# Patient Record
Sex: MALE | Race: BLACK OR AFRICAN AMERICAN | Employment: UNEMPLOYED | URBAN - METROPOLITAN AREA
[De-identification: names, ages, dates, MRNs, and addresses within clinical notes are randomized per-mention and may not be internally consistent; named-entity substitution may affect disease eponyms.]

---

## 2020-06-26 ENCOUNTER — APPOINTMENT (OUTPATIENT)
Dept: GENERAL RADIOLOGY | Age: 35
DRG: 137 | End: 2020-06-26
Payer: MEDICAID

## 2020-06-26 ENCOUNTER — HOSPITAL ENCOUNTER (INPATIENT)
Age: 35
LOS: 4 days | Discharge: HOME | DRG: 137 | End: 2020-06-30
Attending: EMERGENCY MEDICINE | Admitting: INTERNAL MEDICINE
Payer: MEDICAID

## 2020-06-26 PROBLEM — U07.1 COVID-19 VIRUS INFECTION: Status: ACTIVE | Noted: 2020-06-26

## 2020-06-26 LAB
ALBUMIN SERPL-MCNC: 3.5 G/DL (ref 3.5–5.2)
ALP BLD-CCNC: 34 U/L (ref 40–129)
ALT SERPL-CCNC: 26 U/L (ref 0–40)
AMPHETAMINE SCREEN, URINE: NOT DETECTED
ANION GAP SERPL CALCULATED.3IONS-SCNC: 12 MMOL/L (ref 7–16)
AST SERPL-CCNC: 94 U/L (ref 0–39)
B.E.: -0.6 MMOL/L (ref -3–3)
BACTERIA: ABNORMAL /HPF
BARBITURATE SCREEN URINE: NOT DETECTED
BASOPHILS ABSOLUTE: 0 E9/L (ref 0–0.2)
BASOPHILS RELATIVE PERCENT: 0.3 % (ref 0–2)
BENZODIAZEPINE SCREEN, URINE: NOT DETECTED
BILIRUB SERPL-MCNC: 1.6 MG/DL (ref 0–1.2)
BILIRUBIN URINE: ABNORMAL
BLOOD, URINE: ABNORMAL
BUN BLDV-MCNC: 15 MG/DL (ref 6–20)
BURR CELLS: ABNORMAL
C-REACTIVE PROTEIN: 3.4 MG/DL (ref 0–0.4)
CALCIUM SERPL-MCNC: 8.2 MG/DL (ref 8.6–10.2)
CANNABINOID SCREEN URINE: NOT DETECTED
CHLORIDE BLD-SCNC: 97 MMOL/L (ref 98–107)
CHLORIDE URINE RANDOM: 22 MMOL/L
CLARITY: ABNORMAL
CO2: 26 MMOL/L (ref 22–29)
COCAINE METABOLITE SCREEN URINE: NOT DETECTED
COHB: 0.7 % (ref 0–1.5)
COLOR: ABNORMAL
CREAT SERPL-MCNC: 1.6 MG/DL (ref 0.7–1.2)
CREATININE URINE: 76 MG/DL (ref 40–278)
CRITICAL: ABNORMAL
DATE ANALYZED: ABNORMAL
DATE OF COLLECTION: ABNORMAL
EOSINOPHILS ABSOLUTE: 0 E9/L (ref 0.05–0.5)
EOSINOPHILS RELATIVE PERCENT: 0.3 % (ref 0–6)
FENTANYL SCREEN, URINE: NOT DETECTED
FERRITIN: NORMAL NG/ML
GFR AFRICAN AMERICAN: 60
GFR NON-AFRICAN AMERICAN: 60 ML/MIN/1.73
GLUCOSE BLD-MCNC: 101 MG/DL (ref 74–99)
GLUCOSE URINE: NEGATIVE MG/DL
HCO3: 20.4 MMOL/L (ref 22–26)
HCT VFR BLD CALC: 36.5 % (ref 37–54)
HEMOGLOBIN: 12 G/DL (ref 12.5–16.5)
HHB: 4.6 % (ref 0–5)
INR BLD: 1.3
KETONES, URINE: 15 MG/DL
LAB: ABNORMAL
LACTATE DEHYDROGENASE: 1739 U/L (ref 135–225)
LACTIC ACID: 1.4 MMOL/L (ref 0.5–2.2)
LEUKOCYTE ESTERASE, URINE: ABNORMAL
LYMPHOCYTES ABSOLUTE: 0.65 E9/L (ref 1.5–4)
LYMPHOCYTES RELATIVE PERCENT: 19.3 % (ref 20–42)
Lab: ABNORMAL
Lab: NORMAL
MCH RBC QN AUTO: 31.3 PG (ref 26–35)
MCHC RBC AUTO-ENTMCNC: 32.9 % (ref 32–34.5)
MCV RBC AUTO: 95.3 FL (ref 80–99.9)
METHADONE SCREEN, URINE: NOT DETECTED
METHB: 1.4 % (ref 0–1.5)
MODE: ABNORMAL
MONOCYTES ABSOLUTE: 0.27 E9/L (ref 0.1–0.95)
MONOCYTES RELATIVE PERCENT: 7.9 % (ref 2–12)
NEUTROPHILS ABSOLUTE: 2.48 E9/L (ref 1.8–7.3)
NEUTROPHILS RELATIVE PERCENT: 72.8 % (ref 43–80)
NITRITE, URINE: NEGATIVE
O2 CONTENT: 16.7 ML/DL
O2 SATURATION: 95.3 % (ref 92–98.5)
O2HB: 93.3 % (ref 94–97)
OPERATOR ID: ABNORMAL
OPIATE SCREEN URINE: NOT DETECTED
OVALOCYTES: ABNORMAL
OXYCODONE URINE: NOT DETECTED
PATIENT TEMP: 37 C
PCO2: 24.3 MMHG (ref 35–45)
PDW BLD-RTO: 11.6 FL (ref 11.5–15)
PH BLOOD GAS: 7.54 (ref 7.35–7.45)
PH UA: 7 (ref 5–9)
PHENCYCLIDINE SCREEN URINE: NOT DETECTED
PLATELET # BLD: 108 E9/L (ref 130–450)
PMV BLD AUTO: 13 FL (ref 7–12)
PO2: 74.3 MMHG (ref 75–100)
POIKILOCYTES: ABNORMAL
POTASSIUM REFLEX MAGNESIUM: 3.8 MMOL/L (ref 3.5–5)
PRO-BNP: <5 PG/ML (ref 0–125)
PROCALCITONIN: 0.1 NG/ML (ref 0–0.08)
PROTEIN UA: >=300 MG/DL
PROTHROMBIN TIME: 15.3 SEC (ref 9.3–12.4)
RBC # BLD: 3.83 E12/L (ref 3.8–5.8)
RBC UA: ABNORMAL /HPF (ref 0–2)
SARS-COV-2, NAAT: DETECTED
SODIUM BLD-SCNC: 135 MMOL/L (ref 132–146)
SODIUM URINE: 36 MMOL/L
SOURCE, BLOOD GAS: ABNORMAL
SPECIFIC GRAVITY UA: 1.01 (ref 1–1.03)
THB: 12.7 G/DL (ref 11.5–16.5)
TIME ANALYZED: 1133
TOTAL CK: 921 U/L (ref 20–200)
TOTAL PROTEIN: 6.7 G/DL (ref 6.4–8.3)
TROPONIN: <0.01 NG/ML (ref 0–0.03)
UROBILINOGEN, URINE: 4 E.U./DL
WBC # BLD: 3.4 E9/L (ref 4.5–11.5)
WBC UA: ABNORMAL /HPF (ref 0–5)

## 2020-06-26 PROCEDURE — 2580000003 HC RX 258: Performed by: SPECIALIST

## 2020-06-26 PROCEDURE — 82550 ASSAY OF CK (CPK): CPT

## 2020-06-26 PROCEDURE — 6370000000 HC RX 637 (ALT 250 FOR IP): Performed by: EMERGENCY MEDICINE

## 2020-06-26 PROCEDURE — 82436 ASSAY OF URINE CHLORIDE: CPT

## 2020-06-26 PROCEDURE — 71045 X-RAY EXAM CHEST 1 VIEW: CPT

## 2020-06-26 PROCEDURE — 83615 LACTATE (LD) (LDH) ENZYME: CPT

## 2020-06-26 PROCEDURE — 93005 ELECTROCARDIOGRAM TRACING: CPT | Performed by: EMERGENCY MEDICINE

## 2020-06-26 PROCEDURE — 99285 EMERGENCY DEPT VISIT HI MDM: CPT

## 2020-06-26 PROCEDURE — 1200000000 HC SEMI PRIVATE

## 2020-06-26 PROCEDURE — 6370000000 HC RX 637 (ALT 250 FOR IP): Performed by: SPECIALIST

## 2020-06-26 PROCEDURE — 82570 ASSAY OF URINE CREATININE: CPT

## 2020-06-26 PROCEDURE — 82805 BLOOD GASES W/O2 SATURATION: CPT

## 2020-06-26 PROCEDURE — 36415 COLL VENOUS BLD VENIPUNCTURE: CPT

## 2020-06-26 PROCEDURE — 6360000002 HC RX W HCPCS: Performed by: SPECIALIST

## 2020-06-26 PROCEDURE — 2580000003 HC RX 258: Performed by: NURSE PRACTITIONER

## 2020-06-26 PROCEDURE — 2580000003 HC RX 258: Performed by: EMERGENCY MEDICINE

## 2020-06-26 PROCEDURE — 83605 ASSAY OF LACTIC ACID: CPT

## 2020-06-26 PROCEDURE — 83880 ASSAY OF NATRIURETIC PEPTIDE: CPT

## 2020-06-26 PROCEDURE — 84145 PROCALCITONIN (PCT): CPT

## 2020-06-26 PROCEDURE — 84484 ASSAY OF TROPONIN QUANT: CPT

## 2020-06-26 PROCEDURE — 85025 COMPLETE CBC W/AUTO DIFF WBC: CPT

## 2020-06-26 PROCEDURE — 86140 C-REACTIVE PROTEIN: CPT

## 2020-06-26 PROCEDURE — U0002 COVID-19 LAB TEST NON-CDC: HCPCS

## 2020-06-26 PROCEDURE — 80307 DRUG TEST PRSMV CHEM ANLYZR: CPT

## 2020-06-26 PROCEDURE — 82728 ASSAY OF FERRITIN: CPT

## 2020-06-26 PROCEDURE — 85610 PROTHROMBIN TIME: CPT

## 2020-06-26 PROCEDURE — 94761 N-INVAS EAR/PLS OXIMETRY MLT: CPT

## 2020-06-26 PROCEDURE — 81001 URINALYSIS AUTO W/SCOPE: CPT

## 2020-06-26 PROCEDURE — 84300 ASSAY OF URINE SODIUM: CPT

## 2020-06-26 PROCEDURE — 80053 COMPREHEN METABOLIC PANEL: CPT

## 2020-06-26 RX ORDER — ATORVASTATIN CALCIUM 20 MG/1
20 TABLET, FILM COATED ORAL DAILY
Status: ON HOLD | COMMUNITY
End: 2020-06-30 | Stop reason: HOSPADM

## 2020-06-26 RX ORDER — SODIUM CHLORIDE 9 MG/ML
INJECTION, SOLUTION INTRAVENOUS CONTINUOUS
Status: DISCONTINUED | OUTPATIENT
Start: 2020-06-26 | End: 2020-06-30 | Stop reason: HOSPADM

## 2020-06-26 RX ORDER — EPINEPHRINE 1 MG/ML
0.3 INJECTION, SOLUTION, CONCENTRATE INTRAVENOUS
Status: ACTIVE | OUTPATIENT
Start: 2020-06-26 | End: 2020-06-26

## 2020-06-26 RX ORDER — METHYLPREDNISOLONE SODIUM SUCCINATE 125 MG/2ML
125 INJECTION, POWDER, LYOPHILIZED, FOR SOLUTION INTRAMUSCULAR; INTRAVENOUS
Status: ACTIVE | OUTPATIENT
Start: 2020-06-26 | End: 2020-06-26

## 2020-06-26 RX ORDER — ACETAMINOPHEN 325 MG/1
650 TABLET ORAL EVERY 4 HOURS PRN
Status: DISCONTINUED | OUTPATIENT
Start: 2020-06-26 | End: 2020-06-30 | Stop reason: HOSPADM

## 2020-06-26 RX ORDER — 0.9 % SODIUM CHLORIDE 0.9 %
1000 INTRAVENOUS SOLUTION INTRAVENOUS ONCE
Status: COMPLETED | OUTPATIENT
Start: 2020-06-26 | End: 2020-06-26

## 2020-06-26 RX ORDER — SULFAMETHOXAZOLE AND TRIMETHOPRIM 800; 160 MG/1; MG/1
1 TABLET ORAL 2 TIMES DAILY
Status: ON HOLD | COMMUNITY
End: 2020-06-30 | Stop reason: HOSPADM

## 2020-06-26 RX ORDER — ACETAMINOPHEN 500 MG
1000 TABLET ORAL ONCE
Status: COMPLETED | OUTPATIENT
Start: 2020-06-26 | End: 2020-06-26

## 2020-06-26 RX ORDER — ACETAMINOPHEN 325 MG/1
650 TABLET ORAL ONCE
Status: COMPLETED | OUTPATIENT
Start: 2020-06-26 | End: 2020-06-26

## 2020-06-26 RX ORDER — SODIUM CHLORIDE 9 MG/ML
INJECTION, SOLUTION INTRAVENOUS ONCE
Status: COMPLETED | OUTPATIENT
Start: 2020-06-26 | End: 2020-06-26

## 2020-06-26 RX ORDER — NITROGLYCERIN 0.4 MG/1
0.4 TABLET SUBLINGUAL ONCE
Status: DISCONTINUED | OUTPATIENT
Start: 2020-06-26 | End: 2020-06-26

## 2020-06-26 RX ORDER — DIPHENHYDRAMINE HYDROCHLORIDE 50 MG/ML
25 INJECTION INTRAMUSCULAR; INTRAVENOUS ONCE
Status: COMPLETED | OUTPATIENT
Start: 2020-06-26 | End: 2020-06-26

## 2020-06-26 RX ADMIN — SODIUM CHLORIDE 1000 ML: 9 INJECTION, SOLUTION INTRAVENOUS at 14:05

## 2020-06-26 RX ADMIN — DIPHENHYDRAMINE HYDROCHLORIDE 25 MG: 50 INJECTION, SOLUTION INTRAMUSCULAR; INTRAVENOUS at 21:43

## 2020-06-26 RX ADMIN — SODIUM CHLORIDE: 9 INJECTION, SOLUTION INTRAVENOUS at 21:50

## 2020-06-26 RX ADMIN — ACETAMINOPHEN 1000 MG: 500 TABLET, FILM COATED ORAL at 11:10

## 2020-06-26 RX ADMIN — TOCILIZUMAB 400 MG: 20 INJECTION, SOLUTION, CONCENTRATE INTRAVENOUS at 22:27

## 2020-06-26 RX ADMIN — SODIUM CHLORIDE 1000 ML: 9 INJECTION, SOLUTION INTRAVENOUS at 11:26

## 2020-06-26 RX ADMIN — ACETAMINOPHEN 650 MG: 325 TABLET, FILM COATED ORAL at 21:42

## 2020-06-26 RX ADMIN — SODIUM CHLORIDE: 9 INJECTION, SOLUTION INTRAVENOUS at 16:22

## 2020-06-26 SDOH — HEALTH STABILITY: MENTAL HEALTH: HOW OFTEN DO YOU HAVE A DRINK CONTAINING ALCOHOL?: NEVER

## 2020-06-26 ASSESSMENT — ENCOUNTER SYMPTOMS
BLOOD IN STOOL: 0
SHORTNESS OF BREATH: 1
NAUSEA: 0
DIARRHEA: 0
BACK PAIN: 0
ABDOMINAL PAIN: 0
RHINORRHEA: 0
COUGH: 0
VOMITING: 0

## 2020-06-26 ASSESSMENT — PAIN SCALES - GENERAL
PAINLEVEL_OUTOF10: 0
PAINLEVEL_OUTOF10: 6
PAINLEVEL_OUTOF10: 4
PAINLEVEL_OUTOF10: 0
PAINLEVEL_OUTOF10: 0

## 2020-06-26 ASSESSMENT — PAIN DESCRIPTION - PAIN TYPE: TYPE: ACUTE PAIN

## 2020-06-26 ASSESSMENT — PAIN DESCRIPTION - LOCATION: LOCATION: CHEST

## 2020-06-26 ASSESSMENT — PAIN DESCRIPTION - ORIENTATION: ORIENTATION: MID

## 2020-06-26 ASSESSMENT — PAIN DESCRIPTION - DESCRIPTORS: DESCRIPTORS: PRESSURE

## 2020-06-26 NOTE — PROGRESS NOTES
Pharmacy Documentation  Off-Label Use       Date: 6/26  Physician: Dr. Norma Jesus    Medication: Tocilizumab     Pharmacy consulted by ID regarding the benefits for Tocilizumab. Per [facility] Recommendations, patient meet's the following criteria:  1. COVID-19 is confirmed positive  2. Symptom onset within previous 5-10 days (of IL-6 inhibitor administration)  3. IL-6 should be given prior to or within 24 hours of mechanical ventilation  4. One of the following respiratory findings:  a. Rapidly worsening respiratory gas exchange  b. Radiographic infiltrates by imaging (chest x-ray, CT scan, etc.)  c. Clinical assessment (evidence of rales/crackles on physical examination) AND SpO2 ? 93% on room air OR > 6 L/min O2  5. One of the following laboratory findings:  a. Serum IL-6 ? 3 times upper limit of normal  b. Ferritin > 300 ug/L (or surrogate) with doubling within 24 hours  c. Ferritin : 600 ug/L at presentation and LDH > 250 U/L  d. Elevated D-Dimer  e.  Elevated CRP  f. LDH>250     Tocilizumab 400 mg IV once ordered to begin today at 1800  · Pretreat 30 minutes prior to infusion with acetaminophen 650 mg PO and diphenhydramine 25 mg IV  · Epinephrine 0.3 mg IM and Methylprednisolone 125 mg IV once PRN ordered in case of anaphylactic or allergic reaction to infusion, respectively      Alex Larkin PharmD 6/26/2020 4:56 PM   [phone]

## 2020-06-26 NOTE — CONSULTS
No urinary complaints  NEURO:    No seizures, stroke, HA  MUSCULOSKELETAL:  No muscle aches or pain, no jointpain  SKIN:     No rash or itch  PSYCH:    No depression or anxiety    Not in a hospital admission.'  CURRENT MEDICATIONS     Current Facility-Administered Medications:     0.9 % sodium chloride infusion, , Intravenous, Once, Hannah Padilla DO    Current Outpatient Medications:     sulfamethoxazole-trimethoprim (BACTRIM DS;SEPTRA DS) 800-160 MG per tablet, Take 1 tablet by mouth 2 times daily Started 6/25, Disp: , Rfl:     atorvastatin (LIPITOR) 20 MG tablet, Take 20 mg by mouth daily, Disp: , Rfl:   ALLERGIES     Pcn [penicillins]    There is no immunization history on file for this patient.   PAST MEDICAL HISTORY     Past Medical History:   Diagnosis Date    Hyperlipidemia      SURGICAL HISTORY       Past Surgical History:   Procedure Laterality Date    LEG SURGERY      removal of a benign tumor     FAMILY HISTORY       Family History   Problem Relation Age of Onset    Diabetes Mother     Other Father      SOCIAL HISTORY       Social History     Socioeconomic History    Marital status: Single     Spouse name: None    Number of children: None    Years of education: None    Highest education level: None   Occupational History    None   Social Needs    Financial resource strain: None    Food insecurity     Worry: None     Inability: None    Transportation needs     Medical: None     Non-medical: None   Tobacco Use    Smoking status: Never Smoker    Smokeless tobacco: Never Used   Substance and Sexual Activity    Alcohol use: Never     Frequency: Never    Drug use: Never    Sexual activity: Not Currently   Lifestyle    Physical activity     Days per week: None     Minutes per session: None    Stress: None   Relationships    Social connections     Talks on phone: None     Gets together: None     Attends Adventism service: None     Active member of club or organization: None Attends meetings of clubs or organizations: None     Relationship status: None    Intimate partner violence     Fear of current or ex partner: None     Emotionally abused: None     Physically abused: None     Forced sexual activity: None   Other Topics Concern    None   Social History Narrative    None         PHYSICAL EXAM    (up to 7 for level 4, 8 or more forlevel 5)     ED Triage Vitals [06/26/20 1034]   BP Temp Temp Source Pulse Resp SpO2 Height Weight   133/88 102.3 °F (39.1 °C) Oral 87 26 97 % 5' 9\" (1.753 m) 206 lb 14.4 oz (93.8 kg)     Vitals:    Vitals:    06/26/20 1233 06/26/20 1303 06/26/20 1308 06/26/20 1403   BP: 126/77 132/80  124/76   Pulse: 90 82 81 83   Resp: 25 25 26 21   Temp:   99.8 °F (37.7 °C) 99.3 °F (37.4 °C)   TempSrc:   Oral Oral   SpO2:   96% 96%   Weight:       Height:         Physical Exam   Constitutional/General: Alert and oriented, NAD  Head: NC/AT  Eyes: PERRL, EOMI  Mouth: Normal mucosa, no thrush   Neck: Supple, full ROM,    Pulmonary: Lungs dec to auscultation bilaterally. Not in respiratory distress  Cardiovascular:  Regular rate and rhythm, no murmurs, gallops, or rubs. Abdomen: Soft, + BS. No distension. Nontender. Extremities: Moves all extremities x 4. Warm and well perfused  Pulses:  Distal pulses intact  Skin: Warm and dry without rash tattoos  Neurologic:    No focal deficits  Psych: Normal Affect     DIAGNOSTICRESULTS   RADIOLOGY:   Xr Chest Portable    Result Date: 6/26/2020  EXAMINATION: ONE XRAY VIEW OF THE CHEST 6/26/2020 11:04 am COMPARISON: None. HISTORY: ORDERING SYSTEM PROVIDED HISTORY: covid, sob TECHNOLOGIST PROVIDED HISTORY: Reason for exam:->covid, sob FINDINGS: Patchy areas of hazy consolidation in medial right lung base and peripheral left lung base. No pneumothorax or pleural effusion. Heart size is normal.     Patchy areas of basilar consolidation compatible with COVID-19 pneumonia.      LABS  Recent Labs     06/26/20  1045   WBC 3.4*   HGB 12.0*   HCT 36.5*   MCV 95.3   *     Recent Labs     06/26/20  1045      K 3.8   CL 97*   CO2 26   BUN 15   CREATININE 1.6*   GFRAA 60   LABGLOM 60   GLUCOSE 101*   PROT 6.7   LABALBU 3.5   CALCIUM 8.2*   BILITOT 1.6*   ALKPHOS 34*   AST 94*   ALT 26      Lab Results   Component Value Date    COVID19 DETECTED 06/26/2020        MICROBIOLOGY:  Lab Results   Component Value Date    COVID19 DETECTED 06/26/2020     COVID-19/BALA-COV2 LABS    Recent Labs     06/26/20  1045   TROPONINI <0.01   INR 1.3   PROTIME 15.3*   AST 94*   ALT 26         Patient is a 27 y.o. male who presented with   Chief Complaint   Patient presents with    Chest Pain    Fatigue    Other     tested positive for covid about 1 week ago, urine now dark, blood thick per [facility] medical staff        FINAL IMPRESSION      1. COVID-19 virus infection    2. Non-traumatic rhabdomyolysis        Fevers/leukopenia  dale  HYPERBILIRUBINEMIA   -check hiv/hep c  -not on o2  actemra  UNABLE TO RECEVE REMDESIVIR        Imaging and labs were reviewed per medical records and any ID pertinent labs were addressed with the patient. The patient/FAMILY  was educated about the diagnosis, prognosis, indications, risks and benefits of treatment. An opportunity to ask questions was given to the patient/FAMILY and questions were answered. Thank you for involving me in the care of Fransisco Nava. Please do not hesitate to call for any questions or concerns.          Electronically signed by Eric Vera MD on 6/26/2020 at 3:33 PM

## 2020-06-26 NOTE — CARE COORDINATION
Emergency Department Social Work Assessment:    Pt presents to the ED from [facility], with chest pain. Pt is COVID19 positive. ACP note completed. The Nataly Delacruz (Claudia Jony [phone]) is pt's primary decision maker and will need called for any medical decisions/discharge planning. Assigned SW/CM to follow.     Electronically signed by CHAZ Watson, IVANIA on 6/26/2020 at 2:41 PM

## 2020-06-26 NOTE — ACP (ADVANCE CARE PLANNING)
patient to consider either: creating a new advance directive that complies with state-specific requirements; or, if that is not possible, orally revoking that prior directive in accordance with state-specific requirements, which must be documented in the EHR. [] Yes   [x] No   Educated Patient / Brittanybruno Larry regarding differences between Advance Directives and portable DNR orders.     Length of ACP Conversation in minutes:      Conversation Outcomes:  [x] ACP discussion completed  [] Existing advance directive reviewed with patient; no changes to patient's previously recorded wishes  [] New Advance Directive completed  [] Portable Do Not Rescitate prepared for Provider review and signature  [] POLST/POST/MOLST/MOST prepared for Provider review and signature      Follow-up plan:    [] Schedule follow-up conversation to continue planning  [] Referred individual to Provider for additional questions/concerns   [] Advised patient/agent/surrogate to review completed ACP document and update if needed with changes in condition, patient preferences or care setting    [x] This note routed to one or more involved healthcare providers         PT IS AN INMATE AT [facility] ( [phone]) , THE [facility] IS THE PRIMARY DECISION MAKER FOR THIS PT AND WILL NEED CALLED FOR ANY MEDICAL NEEDS .  PLEASE CALL REPORT TO [address] [place] [address]    Electronically signed by CHAZ Esposito, MANAW on 6/26/2020 at 2:40 PM

## 2020-06-26 NOTE — ED PROVIDER NOTES
108 (L) 130 - 450 E9/L    MPV 13.0 (H) 7.0 - 12.0 fL    Neutrophils % 72.8 43.0 - 80.0 %    Lymphocytes % 19.3 (L) 20.0 - 42.0 %    Monocytes % 7.9 2.0 - 12.0 %    Eosinophils % 0.3 0.0 - 6.0 %    Basophils % 0.3 0.0 - 2.0 %    Neutrophils Absolute 2.48 1.80 - 7.30 E9/L    Lymphocytes Absolute 0.65 (L) 1.50 - 4.00 E9/L    Monocytes Absolute 0.27 0.10 - 0.95 E9/L    Eosinophils Absolute 0.00 (L) 0.05 - 0.50 E9/L    Basophils Absolute 0.00 0.00 - 0.20 E9/L    Poikilocytes 1+     [place] Cells 1+     Ovalocytes 1+    Comprehensive Metabolic Panel w/ Reflex to MG   Result Value Ref Range    Sodium 135 132 - 146 mmol/L    Potassium reflex Magnesium 3.8 3.5 - 5.0 mmol/L    Chloride 97 (L) 98 - 107 mmol/L    CO2 26 22 - 29 mmol/L    Anion Gap 12 7 - 16 mmol/L    Glucose 101 (H) 74 - 99 mg/dL    BUN 15 6 - 20 mg/dL    CREATININE 1.6 (H) 0.7 - 1.2 mg/dL    GFR Non-African American 60 >=60 mL/min/1.73    GFR African American 60     Calcium 8.2 (L) 8.6 - 10.2 mg/dL    Total Protein 6.7 6.4 - 8.3 g/dL    Alb 3.5 3.5 - 5.2 g/dL    Total Bilirubin 1.6 (H) 0.0 - 1.2 mg/dL    Alkaline Phosphatase 34 (L) 40 - 129 U/L    ALT 26 0 - 40 U/L    AST 94 (H) 0 - 39 U/L   Protime-INR   Result Value Ref Range    Protime 15.3 (H) 9.3 - 12.4 sec    INR 1.3    Lactic Acid, Plasma   Result Value Ref Range    Lactic Acid 1.4 0.5 - 2.2 mmol/L   Troponin   Result Value Ref Range    Troponin <0.01 0.00 - 0.03 ng/mL   Brain Natriuretic Peptide   Result Value Ref Range    Pro-BNP <5 0 - 125 pg/mL   Urinalysis, reflex to microscopic   Result Value Ref Range    Color, UA DAVID (A) Straw/Yellow    Clarity, UA SLCLOUDY Clear    Glucose, Ur Negative Negative mg/dL    Bilirubin Urine SMALL (A) Negative    Ketones, Urine 15 (A) Negative mg/dL    Specific Gravity, UA 1.015 1.005 - 1.030    Blood, Urine LARGE (A) Negative    pH, UA 7.0 5.0 - 9.0    Protein, UA >=300 (A) Negative mg/dL    Urobilinogen, Urine 4.0 (A) <2.0 E.U./dL    Nitrite, Urine Negative Negative

## 2020-06-26 NOTE — H&P
History and Physical    PCP: Dr. Oneil Thakkar    Admitting Physician: Dr. Ochoa/Dr. Herrera Walker    Consultants: Dr. Kirti Jensen, Dr. Eleazar Squires:  Hematuria, COVID 19 infection    HISTORY OF PRESENT ILLNESS:      This is a 26-year-old -American male who presented to the emergency department from a local [facility]. Patient states that he did test positive for COVID-19 on approximately Saturday. He has been in the [facility]. States he was having fever but no chills at that time. He admits to mild shortness of breath. Denies headache. No rashes. Denies any musculoskeletal discomfort. Admits to anorexia and has not eaten for the last 4 to 5 days. Is able to drink fluids. Patient states that on approximately Monday of this week he developed gross hematuria and that every time he urinates his urine has been bloody. He denies any trauma. States he has not been hit or fallen. He denies any flank pain or dysuria. Again no chills but does admit to fever but is also positive for COVID-19. Emergency room course: Blood pressure 133/88, temperature 102.3 degrees, pulse 87, respirations 26, SPO2 97%. X-ray: Patchy areas of basilar consolidation compatible with COVID-19 pneumonia. Urinalysis-revealed large amount of blood in the urine. ABGs were obtained which revealed a pH of 7.542, PCO2 of 24.3, PO2 was 74.3, HCO3 was 20.4. CK was found to be 921. Lactic acid level was 1.4. INR is 1.3 AST was elevated at 94 with ALT normal at 26. Total bili was 1.6 BUN/creatinine were 15 and 1.6 with a GFR of 60. WBCs were 3400, platelet count was 198, hemoglobin Meticorten were 12 and 36.5. Patient was in need of further evaluation and treatment was therefore admitted to the hospital under the services of Dr. Larry Sears and Dr. Herrera Walker.     PAST MEDICAL Hx:  Past Medical History:   Diagnosis Date    Hyperlipidemia        PAST SURGICAL Hx:   Past Surgical History:   Procedure Laterality Date    LEG SURGERY      removal of a benign tumor       FAMILY Hx:  Family History   Problem Relation Age of Onset    Diabetes Mother     Other Father        HOME MEDICATIONS:  Prior to Admission medications    Medication Sig Start Date End Date Taking?  Authorizing Provider   sulfamethoxazole-trimethoprim (BACTRIM DS;SEPTRA DS) 800-160 MG per tablet Take 1 tablet by mouth 2 times daily Started 6/25   Yes Historical Provider, MD   atorvastatin (LIPITOR) 20 MG tablet Take 20 mg by mouth daily   Yes Historical Provider, MD       ALLERGIES:  Pcn [penicillins]    SOCIAL Hx:  Social History     Socioeconomic History    Marital status: Single     Spouse name: Not on file    Number of children: Not on file    Years of education: Not on file    Highest education level: Not on file   Occupational History    Not on file   Social Needs    Financial resource strain: Not on file    Food insecurity     Worry: Not on file     Inability: Not on file    Transportation needs     Medical: Not on file     Non-medical: Not on file   Tobacco Use    Smoking status: Never Smoker    Smokeless tobacco: Never Used   Substance and Sexual Activity    Alcohol use: Never     Frequency: Never    Drug use: Never    Sexual activity: Not Currently   Lifestyle    Physical activity     Days per week: Not on file     Minutes per session: Not on file    Stress: Not on file   Relationships    Social connections     Talks on phone: Not on file     Gets together: Not on file     Attends Sabianism service: Not on file     Active member of club or organization: Not on file     Attends meetings of clubs or organizations: Not on file     Relationship status: Not on file    Intimate partner violence     Fear of current or ex partner: Not on file     Emotionally abused: Not on file     Physically abused: Not on file     Forced sexual activity: Not on file   Other Topics Concern    Not on file   Social History Narrative    Not on file       ROS:  General:   Denies chills, + fatigue,+  fever, + malaise, night sweats or weight loss    Psychological:   Denies anxiety, disorientation or hallucinations    ENT:    Denies epistaxis, headaches, vertigo or visual changes    Cardiovascular:   Denies any chest pain, irregular heartbeats, or palpitations. No paroxysmal nocturnal dyspnea. Respiratory:   +shortness of breath mid sternal pain with deep breath, denies coughing, sputum production, hemoptysis, or wheezing. No orthopnea. Gastrointestinal:   Denies nausea, vomiting, diarrhea, or constipation. Anorexia. Denies any abdominal pain. Denies change in bowel habits or stools. Genito-Urinary:    Denies any urgency, frequency, + for hematuria. Voiding without difficulty. Musculoskeletal:   Denies joint pain, joint stiffness, joint swelling or muscle pain    Neurology:    Denies any headache or focal neurological deficits. No weakness or paresthesia. Derm:    Denies any rashes, ulcers, or excoriations. Denies bruising. Extremities:   Denies any lower extremity swelling or edema. PHYSICAL EXAM:  VITALS:  Blood pressure 128/77, pulse 84, temperature 98.8 °F (37.1 °C), temperature source Oral, resp. rate 21, height 5' 9\" (1.753 m), weight 206 lb 14.4 oz (93.8 kg), SpO2 95 %. CONSTITUTIONAL:    Awake, alert, cooperative, no apparent distress, and appears stated age    EYES:    PERRL, EOMI, sclera clear, conjunctiva normal    ENT:    Normocephalic, atraumatic, sinuses nontender on palpation. External ears without lesions. Oral pharynx with moist mucus membranes. NECK:    Supple, symmetrical, trachea midline, no adenopathy, thyroid symmetric, not enlarged and no tenderness, skin normal, no bruits, no JVD    HEMATOLOGIC/LYMPHATICS:    No cervical lymphadenopathy and no supraclavicular lymphadenopathy    LUNGS:    Symmetric.  No increased work of breathing, good air exchange, clear to auscultation bilaterally, no wheezes, rhonchi, or rales,     CARDIOVASCULAR: Normal apical impulse, regular rate and rhythm, normal S1 and S2, no S3 or S4, and no murmur noted    ABDOMEN:    No scars, normal bowel sounds, soft, non-distended, non-tender, no masses palpated, no hepatosplenomegaly, no rebound or guarding elicited on palpation     MUSCULOSKELETAL:    There is no redness, warmth, or swelling of the joints. Full range of motion noted. NEUROLOGIC:    Awake, alert, oriented to name, place and time. Cranial nerves II-XII are grossly intact. Motor is 5 out of 5 bilaterally. SKIN:    No bruising or bleeding. No redness, warmth, or swelling. Multiple tattoos    EXTREMITIES:    Peripheral pulses present. No edema, cyanosis, or swelling. OSTEOPATHIC:    Examined in seated and supine positions. Normal thoracic kyphosis and lumbar lordosis. No acute somatic dysfunction. LABORATORY DATA:  Lab Results   Component Value Date     06/26/2020    K 3.8 06/26/2020    CL 97 06/26/2020    CO2 26 06/26/2020    BUN 15 06/26/2020    CREATININE 1.6 06/26/2020    GLUCOSE 101 06/26/2020    CALCIUM 8.2 06/26/2020      Lab Results   Component Value Date    WBC 3.4 (L) 06/26/2020    HGB 12.0 (L) 06/26/2020    HCT 36.5 (L) 06/26/2020    MCV 95.3 06/26/2020     (L) 06/26/2020           Patient Active Problem List    Diagnosis Date Noted    COVID-19 virus infection 06/26/2020     ASSESSMENT:  · Myoglobinuria secondary to nontraumatic rhabdomyolysis  · COVID-19 virus infection  · Acute kidney injury  · Thrombocytopenia  · Non-traumatic rhabdomylosis  · Anorexia    PLAN:      The patient was seen, examined and discussed with Dr. Henna Goodwin. As per discussion with infectious disease team patient will have HIV and acute hepatitis testing. Antibiotics are at the discretion of ID. Patient was agreeable. Patient complains of anorexia therefore will add protein supplementation with each meal and nightly. Monitor oxygen saturation does not require oxygen currently.   Secondary to acute kidney injury nephrology has been consulted. Will await their evaluation recommendations. Continue IV hydration. Repeat CK in the morning. No VTE prophylaxis at present time due to gross hematuria and thrombocytopenia    More than 50% of my  time was spent at the bedside counseling and/or coordination of care with the patient and/or family with face to face contact. This time was spent reviewing notes and laboratory data, instructing and counseling the patient. Time I spent with the family or surrogate(s) is included only if the patient was incapable of providing the necessary information or participating in medical decisionsI also discussed the differential diagnosis and all of the proposed management plans with the patient and individuals accompanying the patient. I reviewed the patient's past medical, surgical history and medication. Please see orders for further plan of care. Reviewed consultant recommendations/notes and/or discussion. Patient's medications were reviewed/continued/adjusted. Labs as ordered. Please see orders for further plan of care. RHODA Mendoza, NP-C  5:00 PM  6/26/2020       I saw and evaluated the patient. I agree with the findings and the plan of care as documented in Wendi Garcia NP-C's  note.     Lori Slaughter D.O., Terell Plate  7:04 PM  6/26/2020

## 2020-06-27 ENCOUNTER — APPOINTMENT (OUTPATIENT)
Dept: ULTRASOUND IMAGING | Age: 35
DRG: 137 | End: 2020-06-27
Payer: MEDICAID

## 2020-06-27 LAB
ALBUMIN SERPL-MCNC: 3.2 G/DL (ref 3.5–5.2)
ALP BLD-CCNC: 34 U/L (ref 40–129)
ALT SERPL-CCNC: 29 U/L (ref 0–40)
ANION GAP SERPL CALCULATED.3IONS-SCNC: 11 MMOL/L (ref 7–16)
AST SERPL-CCNC: 89 U/L (ref 0–39)
ATYPICAL LYMPHOCYTE RELATIVE PERCENT: 0.9 % (ref 0–4)
BASOPHILS ABSOLUTE: 0 E9/L (ref 0–0.2)
BASOPHILS RELATIVE PERCENT: 0 % (ref 0–2)
BILIRUB SERPL-MCNC: 1.8 MG/DL (ref 0–1.2)
BUN BLDV-MCNC: 12 MG/DL (ref 6–20)
BURR CELLS: ABNORMAL
CALCIUM SERPL-MCNC: 8.4 MG/DL (ref 8.6–10.2)
CHLORIDE BLD-SCNC: 104 MMOL/L (ref 98–107)
CHLORIDE URINE RANDOM: 36 MMOL/L
CHOLESTEROL, TOTAL: 161 MG/DL (ref 0–199)
CK MB: <1 NG/ML (ref 0–7.7)
CO2: 26 MMOL/L (ref 22–29)
CREAT SERPL-MCNC: 1.4 MG/DL (ref 0.7–1.2)
CREATININE URINE: 52 MG/DL (ref 40–278)
D DIMER: ABNORMAL NG/ML DDU
EKG ATRIAL RATE: 90 BPM
EKG P AXIS: 37 DEGREES
EKG P-R INTERVAL: 158 MS
EKG Q-T INTERVAL: 364 MS
EKG QRS DURATION: 66 MS
EKG QTC CALCULATION (BAZETT): 445 MS
EKG R AXIS: 39 DEGREES
EKG T AXIS: 48 DEGREES
EKG VENTRICULAR RATE: 90 BPM
EOSINOPHILS ABSOLUTE: 0.02 E9/L (ref 0.05–0.5)
EOSINOPHILS RELATIVE PERCENT: 0.9 % (ref 0–6)
FIBRINOGEN: 562 MG/DL (ref 225–540)
GFR AFRICAN AMERICAN: >60
GFR NON-AFRICAN AMERICAN: >60 ML/MIN/1.73
GLUCOSE BLD-MCNC: 109 MG/DL (ref 74–99)
HBA1C MFR BLD: 4.7 % (ref 4–5.6)
HCT VFR BLD CALC: 35.8 % (ref 37–54)
HDLC SERPL-MCNC: 28 MG/DL
HEMOGLOBIN: 11.4 G/DL (ref 12.5–16.5)
LDL CHOLESTEROL CALCULATED: 102 MG/DL (ref 0–99)
LYMPHOCYTES ABSOLUTE: 0.77 E9/L (ref 1.5–4)
LYMPHOCYTES RELATIVE PERCENT: 34.2 % (ref 20–42)
MAGNESIUM: 2.2 MG/DL (ref 1.6–2.6)
MCH RBC QN AUTO: 31.1 PG (ref 26–35)
MCHC RBC AUTO-ENTMCNC: 31.8 % (ref 32–34.5)
MCV RBC AUTO: 97.5 FL (ref 80–99.9)
MONOCYTES ABSOLUTE: 0.31 E9/L (ref 0.1–0.95)
MONOCYTES RELATIVE PERCENT: 13.5 % (ref 2–12)
NEUTROPHILS ABSOLUTE: 1.12 E9/L (ref 1.8–7.3)
NEUTROPHILS RELATIVE PERCENT: 50.5 % (ref 43–80)
NUCLEATED RED BLOOD CELLS: 1.8 /100 WBC
OSMOLALITY URINE: 230 MOSM/KG (ref 300–900)
OVALOCYTES: ABNORMAL
PDW BLD-RTO: 11.7 FL (ref 11.5–15)
PHOSPHORUS: 3.4 MG/DL (ref 2.5–4.5)
PLATELET # BLD: 128 E9/L (ref 130–450)
PMV BLD AUTO: 12.3 FL (ref 7–12)
POIKILOCYTES: ABNORMAL
POTASSIUM SERPL-SCNC: 4.7 MMOL/L (ref 3.5–5)
RBC # BLD: 3.67 E12/L (ref 3.8–5.8)
SEDIMENTATION RATE, ERYTHROCYTE: 29 MM/HR (ref 0–15)
SODIUM BLD-SCNC: 141 MMOL/L (ref 132–146)
SODIUM URINE: 45 MMOL/L
TOTAL CK: 876 U/L (ref 20–200)
TOTAL PROTEIN: 6.3 G/DL (ref 6.4–8.3)
TRIGL SERPL-MCNC: 154 MG/DL (ref 0–149)
TSH SERPL DL<=0.05 MIU/L-ACNC: 1.98 UIU/ML (ref 0.27–4.2)
VLDLC SERPL CALC-MCNC: 31 MG/DL
WBC # BLD: 2.2 E9/L (ref 4.5–11.5)

## 2020-06-27 PROCEDURE — 93010 ELECTROCARDIOGRAM REPORT: CPT | Performed by: INTERNAL MEDICINE

## 2020-06-27 PROCEDURE — 85384 FIBRINOGEN ACTIVITY: CPT

## 2020-06-27 PROCEDURE — 80061 LIPID PANEL: CPT

## 2020-06-27 PROCEDURE — 83735 ASSAY OF MAGNESIUM: CPT

## 2020-06-27 PROCEDURE — 2580000003 HC RX 258: Performed by: NURSE PRACTITIONER

## 2020-06-27 PROCEDURE — 84300 ASSAY OF URINE SODIUM: CPT

## 2020-06-27 PROCEDURE — 87088 URINE BACTERIA CULTURE: CPT

## 2020-06-27 PROCEDURE — 1200000000 HC SEMI PRIVATE

## 2020-06-27 PROCEDURE — 76705 ECHO EXAM OF ABDOMEN: CPT

## 2020-06-27 PROCEDURE — 36415 COLL VENOUS BLD VENIPUNCTURE: CPT

## 2020-06-27 PROCEDURE — 80053 COMPREHEN METABOLIC PANEL: CPT

## 2020-06-27 PROCEDURE — 83935 ASSAY OF URINE OSMOLALITY: CPT

## 2020-06-27 PROCEDURE — 82553 CREATINE MB FRACTION: CPT

## 2020-06-27 PROCEDURE — 82570 ASSAY OF URINE CREATININE: CPT

## 2020-06-27 PROCEDURE — 82436 ASSAY OF URINE CHLORIDE: CPT

## 2020-06-27 PROCEDURE — 85378 FIBRIN DEGRADE SEMIQUANT: CPT

## 2020-06-27 PROCEDURE — 80074 ACUTE HEPATITIS PANEL: CPT

## 2020-06-27 PROCEDURE — 86703 HIV-1/HIV-2 1 RESULT ANTBDY: CPT

## 2020-06-27 PROCEDURE — 84100 ASSAY OF PHOSPHORUS: CPT

## 2020-06-27 PROCEDURE — 85651 RBC SED RATE NONAUTOMATED: CPT

## 2020-06-27 PROCEDURE — 84443 ASSAY THYROID STIM HORMONE: CPT

## 2020-06-27 PROCEDURE — 6370000000 HC RX 637 (ALT 250 FOR IP): Performed by: SPECIALIST

## 2020-06-27 PROCEDURE — 83036 HEMOGLOBIN GLYCOSYLATED A1C: CPT

## 2020-06-27 PROCEDURE — 85025 COMPLETE CBC W/AUTO DIFF WBC: CPT

## 2020-06-27 PROCEDURE — 82550 ASSAY OF CK (CPK): CPT

## 2020-06-27 RX ORDER — ZINC SULFATE 50(220)MG
50 CAPSULE ORAL DAILY
Status: DISCONTINUED | OUTPATIENT
Start: 2020-06-27 | End: 2020-06-30 | Stop reason: HOSPADM

## 2020-06-27 RX ORDER — DOCUSATE SODIUM 100 MG/1
100 CAPSULE, LIQUID FILLED ORAL DAILY
Status: DISCONTINUED | OUTPATIENT
Start: 2020-06-27 | End: 2020-06-30 | Stop reason: HOSPADM

## 2020-06-27 RX ORDER — ASCORBIC ACID 500 MG
500 TABLET ORAL DAILY
Status: DISCONTINUED | OUTPATIENT
Start: 2020-06-27 | End: 2020-06-30 | Stop reason: HOSPADM

## 2020-06-27 RX ORDER — LANOLIN ALCOHOL/MO/W.PET/CERES
50 CREAM (GRAM) TOPICAL DAILY
Status: DISCONTINUED | OUTPATIENT
Start: 2020-06-27 | End: 2020-06-30 | Stop reason: HOSPADM

## 2020-06-27 RX ADMIN — ZINC SULFATE 220 MG (50 MG) CAPSULE 50 MG: CAPSULE at 17:01

## 2020-06-27 RX ADMIN — PYRIDOXINE HCL TAB 50 MG 50 MG: 50 TAB at 17:01

## 2020-06-27 RX ADMIN — DOCUSATE SODIUM 100 MG: 100 CAPSULE, LIQUID FILLED ORAL at 17:01

## 2020-06-27 RX ADMIN — SODIUM CHLORIDE: 9 INJECTION, SOLUTION INTRAVENOUS at 06:10

## 2020-06-27 RX ADMIN — Medication 500 MG: at 17:01

## 2020-06-27 ASSESSMENT — PAIN SCALES - GENERAL
PAINLEVEL_OUTOF10: 0

## 2020-06-27 NOTE — CONSULTS
[address]                                  CONSULTATION    PATIENT NAME: Lana Guy                      :        1985  MED REC NO:   [id number]                            ROOM:       9904  ACCOUNT NO:   [de-identified]                           ADMIT DATE: 2020  PROVIDER:     Max Hodgkin, MD    CONSULT DATE:  2020    ADMITTING PHYSICIAN:  Kenyetta Buchanan DO    REASON FOR CONSULTATION:  Rhabdomyolysis. HISTORY OF PRESENT ILLNESS:  The patient is seen in consultation at the  request of Dr. Maya Orosco. The patient is seen in the emergency room. Since  the patient is positive for COVID-19, complete physical exam was not  done. The patient was observed through the glass door and thus the  patient's condition was discussed with the ER physician and with the  nursing care of the patient. As per the chart, the patient is an inmate  at the [address] [place] [address] and presents with chief complaints of  pressure-like sensation in his chest as well as fatigue. He recently  tested positive for COVID-19 after having had a fever. Upon  presentation, the patient was found to have a total CPK level of 921  units/L with the serum creatinine of 1.6 mg/dL. We do not have any  prior serum creatinine available for comparison. He has been started on  IV fluids. In the emergency room, the patient's hemodynamics were  stable, but he did have a temperature of 102.3 degrees Fahrenheit. The  patient was seen by Infectious Disease and has been ordered tocilizumab. The patient is presently in no acute distress. PAST MEDICAL HISTORY:  Unremarkable except for hyperlipidemia. PAST SURGICAL HISTORY:  Significant for removal of a benign lesion from  the leg. ALLERGIES:  The patient is allergic to PENICILLIN.     MEDICATIONS PRIOR TO ADMISSION:  Included sulfamethoxazole/trimethoprim  double strength one tablet b.i.d., started a day prior to presentation  and atorvastatin 20 mg daily. SOCIAL HISTORY:  The patient is presently an inmate at the [facility]. He has no history of tobacco use. No history of  alcohol use. FAMILY HISTORY:  Significant for diabetes mellitus in his mother. REVIEW OF SYSTEMS:  As per the chart review of system is significant for  fever and chills. He has had shortness of breath, but no cough. He has  fever, malaise, and fatigue. No nausea, vomiting, or diarrhea. No  constipation. No dysuria or increased frequency of micturition. There  is a questionable history of microscopic hematuria. He has had muscle  aches and pains. No history of use of over-the-counter nonsteroidal  antiinflammatory agents. Rest of the review of system is negative. PHYSICAL EXAMINATION:  GENERAL:  The patient is awake and alert, in no distress. VITAL SIGNS:  Blood pressure is 134/75, pulse is 78, respiratory rate is  20, and temperature 98.8 degrees Fahrenheit. EXTREMITIES:  He has trace pedal edema. Complete physical examination is not done as the patient is in isolation  for COVID-19 infection. LABORATORY DATA:  From today, sodium 135 mmol/L, potassium 3.8 mmol/L,  chloride 97 mmol/L, CO2 26 mmol/L. BUN 15 mg/dL, creatinine 1.6 mg/dL. Lactic acid 1.4 mg/dL, glucose 101 mg/dL, calcium 8.2 mg/dL. Hemoglobin  12 gm/dL, hematocrit 36.5%, WBC count 3400, and platelet count is  084,123. Random urine chloride was 22. Random urine sodium of 36. Random urine creatinine of 76. Urinalysis showed specific gravity  greater of 1.015 with greater than 300 protein, positive for blood,  trace leukocyte esterase, and negative for nitrites, 2-5 wbc's with no  rbc's. IMPRESSION:  1. Rhabdomyolysis. Rhabdomyolysis of undetermined etiology. There  have been reported cases of rhabdomyolysis associated with COVID-19  infection. The severity of the rhabdomyolysis is mild.   We also need to  consider possibility of rhabdomyolysis in this patient secondary to use  of statins. We will follow serial CPK levels. We will start the  patient on hydration. The severity of rhabdomyolysis at the current  time is not severe enough to  adversely affect the renal function. The patient  does have urinalysis showing positive for blood, but does not show any  rbc's which again is secondary to rhabdomyolysis. 2.  Renal insufficiency. We do not have any baseline serum creatinine  available for comparison. We will follow serial serum creatinine and electrolytes. .  The  patient may have some renal insufficiency and associated use of Bactrim  in a volume depleted state with fever. The patient's GFR using MDRD  study equation is 66 per minute. 3.  Proteinuria. Proteinuria may be again a reflection of fever. We  will need to reestablish the magnitude of proteinuria once the acute  illness is over. PLAN:  Plan is to continue hydration. Follow urinary output and serum  creatinine. Follow CPK levels. Rest of plan as per orders. Thank you for allowing me to participate in the care of this patient. We will follow the patient with you.         Celeste Thornton MD    D: 06/26/2020 19:07:35       T: 06/26/2020 19:50:07     [email or web address]  Job#: [id number]     Doc#: [id number]    CC:

## 2020-06-27 NOTE — PROGRESS NOTES
Multiple tattoos  Hematologic/Lymphatic:  Denies bruising or bleeding. Physical Exam:  I/O this shift:  In: -   Out: 600 [Urine:600]    Intake/Output Summary (Last 24 hours) at 6/27/2020 1333  Last data filed at 6/27/2020 1238  Gross per 24 hour   Intake 1380 ml   Output 3200 ml   Net -1820 ml   I/O last 3 completed shifts: In: 1380 [P.O.:480; I.V.:800; IV Piggyback:100]  Out: 2600 [Urine:2600]  Patient Vitals for the past 96 hrs (Last 3 readings):   Weight   06/26/20 1800 206 lb (93.4 kg)   06/26/20 1034 206 lb 14.4 oz (93.8 kg)       Vital Signs:   Blood pressure 130/77, pulse 84, temperature 98 °F (36.7 °C), temperature source Oral, resp. rate 18, height 5' 9\" (1.753 m), weight 206 lb (93.4 kg), SpO2 96 %. Anuradha Morrell is a 27 y. o.  male who is alert, responsive, oriented to person, place, and time. General appearance:   Well preserved, alert, no distress. Head:  Normocephalic. No masses, lesions or tenderness. Eyes:  PERRLA. EOMI. Sclera clear. Buccal mucosa moist.  ENT:  Ears normal. Mucosa normal.  Neck:    Supple. Trachea midline. No thyromegaly. No JVD. No bruits. Heart:    Rhythm regular. Rate controlled. No murmurs. Lungs:    Symmetrical. Clear to auscultation bilaterally. No wheezes. No rhonchi. No rales. Abdomen:   Soft. Non-tender. Non-distended. Bowel sounds positive. No organomegaly or masses. No pain on palpation. Extremities:    Peripheral pulses present. No peripheral edema. No ulcers. No cyanosis. No clubbing. Neurologic:    Alert x 3. No focal deficit. Cranial nerves grossly intact. No focal weakness. Psych:   Behavior is normal. Mood appears normal. Speech is not rapid and/or pressured. Musculoskeletal:   Spine ROM normal. Muscular strength intact. Gait not assessed. Integumentary:  No rashes  Skin normal color and texture.   Multiple tattoos  Genitalia/Breast:  Deferred      Allergy:  Allergies   Allergen Reactions    Pcn [Penicillins] Hives Medication:  Scheduled Meds:  Continuous Infusions:   sodium chloride 100 mL/hr at 06/27/20 0610       Objective Data:  CBC:   Recent Labs     06/26/20  1045 06/27/20  0715   WBC 3.4* 2.2*   HGB 12.0* 11.4*   * 128*     BMP:    Recent Labs     06/26/20  1045 06/27/20  0715    141   K 3.8 4.7   CL 97* 104   CO2 26 26   BUN 15 12   CREATININE 1.6* 1.4*   GLUCOSE 101* 109*     CMP:    Lab Results   Component Value Date     06/27/2020    K 4.7 06/27/2020    K 3.8 06/26/2020     06/27/2020    CO2 26 06/27/2020    BUN 12 06/27/2020    CREATININE 1.4 06/27/2020    GFRAA >60 06/27/2020    LABGLOM >60 06/27/2020    GLUCOSE 109 06/27/2020    PROT 6.3 06/27/2020    LABALBU 3.2 06/27/2020    CALCIUM 8.4 06/27/2020    BILITOT 1.8 06/27/2020    ALKPHOS 34 06/27/2020    AST 89 06/27/2020    ALT 29 06/27/2020     Hepatic:   Recent Labs     06/26/20  1045 06/27/20  0715   AST 94* 89*   ALT 26 29   BILITOT 1.6* 1.8*   ALKPHOS 34* 34*     Troponin:   Recent Labs     06/26/20  1045   TROPONINI <0.01     BNP: No results for input(s): BNP in the last 72 hours. Lipids:   Recent Labs     06/27/20  0715   CHOL 161   HDL 28     ABGs: No results found for: PHART, PO2ART, NSL9SKY  INR:   Recent Labs     06/26/20  1045   INR 1.3   PROTIME 15.3*     RAD: Xr Chest Portable    Result Date: 6/26/2020  EXAMINATION: ONE XRAY VIEW OF THE CHEST 6/26/2020 11:04 am COMPARISON: None. HISTORY: ORDERING SYSTEM PROVIDED HISTORY: covid, sob TECHNOLOGIST PROVIDED HISTORY: Reason for exam:->covid, sob FINDINGS: Patchy areas of hazy consolidation in medial right lung base and peripheral left lung base. No pneumothorax or pleural effusion. Heart size is normal.     Patchy areas of basilar consolidation compatible with COVID-19 pneumonia.        Wound Documentation:        Chronic Hospital Medical Problems:  Past Medical History:   Diagnosis Date    Hyperlipidemia      Active Problems:    COVID-19 virus infection  Resolved Problems:    * No resolved hospital problems. *      Assessment:    · Myoglobinuria secondary to nontraumatic rhabdomyolysis  · COVID-19 virus infection  · Acute kidney injury  · Thrombocytopenia  · Proteinuria  · Anorexia  · Leukkopenia    Plan:   Patient has been febrile over the past 24 hours with highest temperature elevation of 101.2. Infectious disease is following the patient and antibiotics are at their discretion. Oxygen saturation on room air is consistently greater than 95%. No respiratory complaints. Dietary intake has improved. Patient reports that his appetite seems better as previously he had reported anorexia. We will continue IV hydration. Monitor respiratory status closely. Appreciate nephrology input and recommendations have been reviewed. Continue steroids. Appetite has improved and the patient states that he is eating better. Will continue protein supplement. Await HIV and hepatitis results. Monitor labs. CK is trending downward. Will continue to monitor. Activity as permitted/tolerated. I reviewed the patient's past medical, surgical history and medication. Patient's medications were reviewed/continued/adjusted. Labs as ordered. Please see orders for further plan of care. Rhythm strips reviewed as well as consultant recommendations/notes and/or discussion. I reviewed the  course of events since last visit. More than 50% of my  time was spent at the bedside counseling and/or coordination of care with the patient and/or family with face to face contact. This time was spent reviewing notes and laboratory data, instructing and counseling the patient. Time I spent with the family or surrogate(s) is included only if the patient was incapable of providing the necessary information or participating in medical decisionsI also discussed the differential diagnosis and all of the proposed management plans with the patient and individuals accompanying the patient.     Meagan Peña requires this high level of physician care and nursing in the [facility] due the complexity of decision management and chance of rapid decline or death. Continued cardiac monitoring and higher level of nursing are required. I am ready available for decision making and intervention. I reviewed the relevant imaging studies and available reports. I also discussed the differential diagnosis and all of the proposed management plans with the patient and individuals accompanying the patient to this visit. I reviewed the relevant imaging studies and available reports. YAN Mendoza - CNP, F.A.C.O.I. On 6/27/2020  1:33 PM       I saw and evaluated the patient. I agree with the findings and the plan of care as documented in Wendi Garcia NP-C's  note.     Lori Slaughter D.O., [place]  3:03 PM  6/27/2020

## 2020-06-27 NOTE — PLAN OF CARE
Problem: Airway Clearance - Ineffective  Goal: Achieve or maintain patent airway  6/27/2020 1622 by Brenda Scott RN  Outcome: Met This Shift  6/27/2020 0324 by Brion Kayser, RN  Outcome: Met This Shift     Problem: Gas Exchange - Impaired  Goal: Absence of hypoxia  6/27/2020 1622 by Brenda Scott RN  Outcome: Met This Shift  6/27/2020 0324 by Brion Kayser, RN  Outcome: Met This Shift     Problem: Breathing Pattern - Ineffective  Goal: Ability to achieve and maintain a regular respiratory rate  6/27/2020 1622 by Brenda Scott RN  Outcome: Met This Shift  6/27/2020 0324 by Brion Kayser, RN  Outcome: Met This Shift     Problem: Gas Exchange - Impaired  Goal: Absence of hypoxia  6/27/2020 1622 by Brenda Scott RN  Outcome: Met This Shift  6/27/2020 0324 by Brion Kayser, RN  Outcome: Met This Shift     Problem: Gas Exchange - Impaired  Goal: Promote optimal lung function  6/27/2020 1622 by Brenda Scott RN  Outcome: Met This Shift     Problem:  Body Temperature -  Risk of, Imbalanced  Goal: Ability to maintain a body temperature within defined limits  Outcome: Met This Shift     Problem: Isolation Precautions - Risk of Spread of Infection  Goal: Prevent transmission of infection  Outcome: Met This Shift     Problem: Nutrition Deficits  Goal: Optimize nutrtional status  Outcome: Met This Shift     Problem: Risk for Fluid Volume Deficit  Goal: Maintain normal heart rhythm  Outcome: Met This Shift     Problem: Risk for Fluid Volume Deficit  Goal: Maintain absence of muscle cramping  Outcome: Met This Shift     Problem: Risk for Fluid Volume Deficit  Goal: Maintain normal serum potassium, sodium, calcium, phosphorus, and pH  Outcome: Met This Shift

## 2020-06-27 NOTE — PROGRESS NOTES
Progress Note  6/27/2020 12:35 PM  Subjective:   Admit Date: 6/26/2020  PCP: Melecio Alvarez, DO  Interval History: Patient seen , doing well , no complains     Diet: DIET GENERAL;  Dietary Nutrition Supplements: Standard High Calorie Oral Supplement    Data:   Scheduled Meds:  Continuous Infusions:   sodium chloride 100 mL/hr at 06/27/20 0610     PRN Meds:acetaminophen  I/O last 3 completed shifts: In: 1380 [P.O.:480; I.V.:800; IV Piggyback:100]  Out: 2600 [Urine:2600]  No intake/output data recorded. Intake/Output Summary (Last 24 hours) at 6/27/2020 1235  Last data filed at 6/27/2020 0415  Gross per 24 hour   Intake 1380 ml   Output 2600 ml   Net -1220 ml     CBC:   Recent Labs     06/26/20  1045 06/27/20  0715   WBC 3.4* 2.2*   HGB 12.0* 11.4*   * 128*     BMP:    Recent Labs     06/26/20  1045 06/27/20  0715    141   K 3.8 4.7   CL 97* 104   CO2 26 26   BUN 15 12   CREATININE 1.6* 1.4*   GLUCOSE 101* 109*     Hepatic:   Recent Labs     06/26/20  1045 06/27/20  0715   AST 94* 89*   ALT 26 29   BILITOT 1.6* 1.8*   ALKPHOS 34* 34*     Troponin:   Recent Labs     06/26/20  1045   TROPONINI <0.01     BNP: No results for input(s): BNP in the last 72 hours. Lipids:   Recent Labs     06/27/20  0715   CHOL 161   HDL 28     ABGs: No results found for: PHART, PO2ART, KIJ6OPH  INR:   Recent Labs     06/26/20  1045   INR 1.3       -----------------------------------------------------------------  RAD: Xr Chest Portable    Result Date: 6/26/2020  EXAMINATION: ONE XRAY VIEW OF THE CHEST 6/26/2020 11:04 am COMPARISON: None. HISTORY: ORDERING SYSTEM PROVIDED HISTORY: covid, sob TECHNOLOGIST PROVIDED HISTORY: Reason for exam:->covid, sob FINDINGS: Patchy areas of hazy consolidation in medial right lung base and peripheral left lung base. No pneumothorax or pleural effusion. Heart size is normal.     Patchy areas of basilar consolidation compatible with COVID-19 pneumonia.        Objective:   Vitals: /77 Pulse 84   Temp 98 °F (36.7 °C) (Oral)   Resp 18   Ht 5' 9\" (1.753 m)   Wt 206 lb (93.4 kg)   SpO2 96%   BMI 30.42 kg/m²   General appearance: appears stated age   Skin:  No rashes or lesions    Assessment:   Patient Active Problem List:     [id number] virus infection    Plan:   IMPRESSION:  1. Rhabdomyolysis. Rhabdomyolysis of undetermined etiology. There  have been reported cases of rhabdomyolysis associated with COVID-19  infection. The severity of the rhabdomyolysis is mild. We also need to  consider possibility of rhabdomyolysis in this patient secondary to use  of statins. We will follow serial CPK levels. We will start the  patient on hydration. The severity of rhabdomyolysis at the current  time is not severe enough for infecting renal function _____ The patient  does have urinalysis showing positive for blood, but does not show any  rbc's which again is secondary to rhabdomyolysis. 2.  Renal insufficiency. We do not have any baseline serum creatinine  available for comparison. We will follow serial electrolytes. The  patient may have some renal insufficiency and associated use of Bactrim  in a volume depleted state with fever. Creatinine better at 1.4     3. Proteinuria. Proteinuria may be again a reflection of fever. We  will need to reestablish the magnitude of proteinuria once the acute  illness is over.     PLAN:  Plan is to continue hydration. Thank you for allowing me to participate in the care of this patient.      We will follow the patient with you.       Mansoor Raines

## 2020-06-27 NOTE — PROGRESS NOTES
Patient unable to ambulate fairly well this shift attempted x2, SOB  With ambulation. Educated on proning but patient unable to perform, stated too uncomfortable, he did sit on the side of the bed several times and turn side to side in bed, poor use of incentive spirometer this shift .  Caryn Cintron

## 2020-06-27 NOTE — PROGRESS NOTES
D Dimer will be done tomorrow ordered per Dr. Johnson Moore, lab today tossed per lab.  Christiana Damian

## 2020-06-28 ENCOUNTER — APPOINTMENT (OUTPATIENT)
Dept: CT IMAGING | Age: 35
DRG: 137 | End: 2020-06-28
Payer: MEDICAID

## 2020-06-28 PROBLEM — U07.1 COVID-19: Status: ACTIVE | Noted: 2020-06-28

## 2020-06-28 LAB
ANION GAP SERPL CALCULATED.3IONS-SCNC: 10 MMOL/L (ref 7–16)
ATYPICAL LYMPHOCYTE RELATIVE PERCENT: 1 % (ref 0–4)
BASOPHILS ABSOLUTE: 0 E9/L (ref 0–0.2)
BASOPHILS RELATIVE PERCENT: 0 % (ref 0–2)
BUN BLDV-MCNC: 10 MG/DL (ref 6–20)
C-REACTIVE PROTEIN: 1.2 MG/DL (ref 0–0.4)
CALCIUM SERPL-MCNC: 8.8 MG/DL (ref 8.6–10.2)
CHLORIDE BLD-SCNC: 104 MMOL/L (ref 98–107)
CO2: 27 MMOL/L (ref 22–29)
CREAT SERPL-MCNC: 1.1 MG/DL (ref 0.7–1.2)
D DIMER: 1666 NG/ML DDU
EOSINOPHILS ABSOLUTE: 0.04 E9/L (ref 0.05–0.5)
EOSINOPHILS RELATIVE PERCENT: 2 % (ref 0–6)
FERRITIN: 5334 NG/ML
FIBRINOGEN: 508 MG/DL (ref 225–540)
GFR AFRICAN AMERICAN: >60
GFR NON-AFRICAN AMERICAN: >60 ML/MIN/1.73
GLUCOSE BLD-MCNC: 119 MG/DL (ref 74–99)
HCT VFR BLD CALC: 35.5 % (ref 37–54)
HEMOGLOBIN: 10.9 G/DL (ref 12.5–16.5)
INR BLD: 1.2
LACTATE DEHYDROGENASE: 1864 U/L (ref 135–225)
LYMPHOCYTES ABSOLUTE: 0.62 E9/L (ref 1.5–4)
LYMPHOCYTES RELATIVE PERCENT: 30 % (ref 20–42)
MAGNESIUM: 1.9 MG/DL (ref 1.6–2.6)
MCH RBC QN AUTO: 30.5 PG (ref 26–35)
MCHC RBC AUTO-ENTMCNC: 30.7 % (ref 32–34.5)
MCV RBC AUTO: 99.4 FL (ref 80–99.9)
MONOCYTES ABSOLUTE: 0.32 E9/L (ref 0.1–0.95)
MONOCYTES RELATIVE PERCENT: 16 % (ref 2–12)
NEUTROPHILS ABSOLUTE: 1.02 E9/L (ref 1.8–7.3)
NEUTROPHILS RELATIVE PERCENT: 51 % (ref 43–80)
PDW BLD-RTO: 11.6 FL (ref 11.5–15)
PHOSPHORUS: 2.7 MG/DL (ref 2.5–4.5)
PLATELET # BLD: 150 E9/L (ref 130–450)
PMV BLD AUTO: 11.7 FL (ref 7–12)
POTASSIUM SERPL-SCNC: 4.6 MMOL/L (ref 3.5–5)
PROCALCITONIN: 0.08 NG/ML (ref 0–0.08)
PROTHROMBIN TIME: 13.6 SEC (ref 9.3–12.4)
RBC # BLD: 3.57 E12/L (ref 3.8–5.8)
SEDIMENTATION RATE, ERYTHROCYTE: 26 MM/HR (ref 0–15)
SODIUM BLD-SCNC: 141 MMOL/L (ref 132–146)
TOTAL CK: 785 U/L (ref 20–200)
URIC ACID, SERUM: 2.2 MG/DL (ref 3.4–7)
WBC # BLD: 2 E9/L (ref 4.5–11.5)

## 2020-06-28 PROCEDURE — 85025 COMPLETE CBC W/AUTO DIFF WBC: CPT

## 2020-06-28 PROCEDURE — 85378 FIBRIN DEGRADE SEMIQUANT: CPT

## 2020-06-28 PROCEDURE — 2580000003 HC RX 258: Performed by: NURSE PRACTITIONER

## 2020-06-28 PROCEDURE — 86140 C-REACTIVE PROTEIN: CPT

## 2020-06-28 PROCEDURE — 84100 ASSAY OF PHOSPHORUS: CPT

## 2020-06-28 PROCEDURE — 82550 ASSAY OF CK (CPK): CPT

## 2020-06-28 PROCEDURE — 80048 BASIC METABOLIC PNL TOTAL CA: CPT

## 2020-06-28 PROCEDURE — 86360 T CELL ABSOLUTE COUNT/RATIO: CPT

## 2020-06-28 PROCEDURE — 82728 ASSAY OF FERRITIN: CPT

## 2020-06-28 PROCEDURE — 74176 CT ABD & PELVIS W/O CONTRAST: CPT

## 2020-06-28 PROCEDURE — 86359 T CELLS TOTAL COUNT: CPT

## 2020-06-28 PROCEDURE — 6360000004 HC RX CONTRAST MEDICATION: Performed by: RADIOLOGY

## 2020-06-28 PROCEDURE — 84145 PROCALCITONIN (PCT): CPT

## 2020-06-28 PROCEDURE — 85651 RBC SED RATE NONAUTOMATED: CPT

## 2020-06-28 PROCEDURE — 84550 ASSAY OF BLOOD/URIC ACID: CPT

## 2020-06-28 PROCEDURE — 1200000000 HC SEMI PRIVATE

## 2020-06-28 PROCEDURE — 6370000000 HC RX 637 (ALT 250 FOR IP): Performed by: SPECIALIST

## 2020-06-28 PROCEDURE — 85610 PROTHROMBIN TIME: CPT

## 2020-06-28 PROCEDURE — 83735 ASSAY OF MAGNESIUM: CPT

## 2020-06-28 PROCEDURE — 36415 COLL VENOUS BLD VENIPUNCTURE: CPT

## 2020-06-28 PROCEDURE — 83615 LACTATE (LD) (LDH) ENZYME: CPT

## 2020-06-28 PROCEDURE — 85384 FIBRINOGEN ACTIVITY: CPT

## 2020-06-28 RX ADMIN — Medication 500 MG: at 08:40

## 2020-06-28 RX ADMIN — IOHEXOL 50 ML: 240 INJECTION, SOLUTION INTRATHECAL; INTRAVASCULAR; INTRAVENOUS; ORAL at 17:43

## 2020-06-28 RX ADMIN — PYRIDOXINE HCL TAB 50 MG 50 MG: 50 TAB at 08:40

## 2020-06-28 RX ADMIN — DOCUSATE SODIUM 100 MG: 100 CAPSULE, LIQUID FILLED ORAL at 08:40

## 2020-06-28 RX ADMIN — IOHEXOL 50 ML: 240 INJECTION, SOLUTION INTRATHECAL; INTRAVASCULAR; INTRAVENOUS; ORAL at 15:00

## 2020-06-28 RX ADMIN — SODIUM CHLORIDE: 9 INJECTION, SOLUTION INTRAVENOUS at 04:00

## 2020-06-28 RX ADMIN — ZINC SULFATE 220 MG (50 MG) CAPSULE 50 MG: CAPSULE at 08:40

## 2020-06-28 ASSESSMENT — PAIN SCALES - GENERAL
PAINLEVEL_OUTOF10: 0

## 2020-06-28 NOTE — PROGRESS NOTES
[facility] PROGRESS NOTE    F/u COVID-19    FACE TO FACE was done   All relevant records and diagnostic tests were reviewed, including laboratory results and imaging. Please reference any relevant documentation elsewhere    SUBJECTIVE:  Bentley Tony, 27 y.o., male   nad he feels well off o2  Pt was discussed with care team    ROS:GENERAL-             GENERAL:Temperature:  Current - Temp: 97.5 °F (36.4 °C);  Max - Temp  Av.8 °F (36.6 °C)  Min: 97 °F (36.1 °C)  Max: 98.5 °F (36.9 °C)  Respiratory Rate : Resp  Av  Min: 16  Max: 20  Pulse Range: Pulse  Av.4  Min: 75  Max: 114  Blood Pressure Range:  Systolic (14LXL), GMW:379 , Min:117 , DYS:672   ; Diastolic (83CHT), KZC:07, Min:72, Max:77    Pulse ox Range: SpO2  Av.4 %  Min: 95 %  Max: 96 %  24hr I & O:      Intake/Output Summary (Last 24 hours) at 2020 1348  Last data filed at 2020 1251  Gross per 24 hour   Intake 2227 ml   Output 1750 ml   Net 477 ml       CONSTITUTIONAL: AWAKE ALERT NAD  HEENT: at/nc  NECK:  supple, symmetrical, trachea midline  LUNGS:clear to ant B  CARDIOVASCULAR:  S1 and S2   ABDOMEN:  normal bowel sounds, non-distended, non-tender   EXTREMITIES: FROM no rash   SKIN:  normal skin color NO RASH     MEDS:  vitamin C (ASCORBIC ACID) tablet 500 mg, Daily  vitamin B-6 (PYRIDOXINE) tablet 50 mg, Daily  zinc sulfate (ZINCATE) capsule 50 mg, Daily  docusate sodium (COLACE) capsule 100 mg, Daily  0.9 % sodium chloride infusion, Continuous  acetaminophen (TYLENOL) tablet 650 mg, Q4H PRN          Data:  Lab Results   Component Value Date    COVID19 DETECTED 2020     COVID-19/BALA-COV2 LABS  Recent Labs     20  1045 20  0715 20  0800   CRP 3.4*  --  1.2*   PROCAL 0.10*  --  0.08   FERRITIN >10,000  --  5,334   LDH 1,739*  --  1,864*   TROPONINI <0.01  --   --    DDIMER  --  SEE BELOW* 9647   FIBRINOGEN  --  562* 508   INR 1.3  --  1.2   PROTIME 15.3*  --  13.6*   AST 94* 89*  --    ALT 26 29  --    TRIG  --  154*  --      Lab Results   Component Value Date    CHOL 161 06/27/2020    TRIG 154 06/27/2020    HDL 28 06/27/2020    LDLCALC 102 06/27/2020    LABVLDL 31 06/27/2020     Recent Labs     06/26/20  1045 06/27/20  0715 06/28/20  0800   WBC 3.4* 2.2* 2.0*   HGB 12.0* 11.4* 10.9*   HCT 36.5* 35.8* 35.5*   * 128* 150   MCV 95.3 97.5 99.4   MCH 31.3 31.1 30.5   MCHC 32.9 31.8* 30.7*   RDW 11.6 11.7 11.6   NRBC  --  1.8  --    LYMPHOPCT 19.3* 34.2 30.0   MONOPCT 7.9 13.5* 16.0*   BASOPCT 0.3 0.0 0.0   MONOSABS 0.27 0.31 0.32   LYMPHSABS 0.65* 0.77* 0.62*   EOSABS 0.00* 0.02* 0.04*   BASOSABS 0.00 0.00 0.00     Recent Labs     06/26/20  1045  06/27/20  0715 06/28/20  0800     --  141 141   K 3.8   < > 4.7 4.6   CL 97*  --  104 104   CO2 26  --  26 27   BUN 15  --  12 10   CREATININE 1.6*  --  1.4* 1.1   GFRAA 60  --  >60 >60   LABGLOM 60  --  >60 >60   GLUCOSE 101*  --  109* 119*   PROT 6.7  --  6.3*  --    LABALBU 3.5  --  3.2*  --    CALCIUM 8.2*  --  8.4* 8.8   BILITOT 1.6*  --  1.8*  --    ALKPHOS 34*  --  34*  --    AST 94*  --  89*  --    ALT 26  --  29  --     < > = values in this interval not displayed.      U/A:    Lab Results   Component Value Date    COLORU DAVID 06/26/2020    PROTEINU >=300 06/26/2020    PHUR 7.0 06/26/2020    WBCUA 2-5 06/26/2020    RBCUA NONE 06/26/2020    BACTERIA FEW 06/26/2020    CLARITYU SLCLOUDY 06/26/2020    SPECGRAV 1.015 06/26/2020    LEUKOCYTESUR TRACE 06/26/2020    UROBILINOGEN 4.0 06/26/2020    BILIRUBINUR SMALL 06/26/2020    BLOODU LARGE 06/26/2020    GLUCOSEU Negative 06/26/2020        MICRO  Blood cultures No results found for: BC    ASSESSMENT:    Active Hospital Problems    Diagnosis Date Noted    COVID-19 virus infection [U07.1] 06/26/2020     LEUKOPENIA  FEVERS RESOLVED  SARS2-COVID-19 PNEUMONIA ON RA  HYPERBILIRUBINEMIA    · Actemra IL-6  · US RUQ  Not a candidate for remesivir         Follow COVID-19/BALA-COV2 LABS     Checking hep panel/hiv/t c ells  Baseline triglyceride/LIPID PANEL   DVT prophylaxis/TREATMENT  ?imaging abdomen  US unremarkable   Spoke with care team Dr Maritza newsome    PLAN: Mariela Downing.       Electronically signed by Rodney Dukes MD on 6/27/20 at 3:24 PM EDT

## 2020-06-28 NOTE — PLAN OF CARE
Problem: Airway Clearance - Ineffective  Goal: Achieve or maintain patent airway  6/28/2020 0246 by Leilani Martinez RN  Outcome: Met This Shift     Problem: Gas Exchange - Impaired  Goal: Absence of hypoxia  6/28/2020 0246 by Leilani Martinez RN  Outcome: Met This Shift     Problem: Gas Exchange - Impaired  Goal: Promote optimal lung function  6/28/2020 0246 by Leilani Martinez RN  Outcome: Met This Shift     Problem: Breathing Pattern - Ineffective  Goal: Ability to achieve and maintain a regular respiratory rate  6/28/2020 0246 by Leilani Martinez RN  Outcome: Met This Shift     Problem: Body Temperature -  Risk of, Imbalanced  Goal: Ability to maintain a body temperature within defined limits  6/28/2020 0246 by Leilani Martinez RN  Outcome: Met This Shift     Problem: Body Temperature -  Risk of, Imbalanced  Goal: Will regain or maintain usual level of consciousness  6/28/2020 0246 by Leilani Martinez RN  Outcome: Met This Shift     Problem:  Body Temperature -  Risk of, Imbalanced  Goal: Complications related to the disease process, condition or treatment will be avoided or minimized  6/28/2020 0246 by Leilani Martinez RN  Outcome: Met This Shift     Problem: Isolation Precautions - Risk of Spread of Infection  Goal: Prevent transmission of infection  6/28/2020 0246 by Leilani Martinez RN  Outcome: Met This Shift     Problem: Nutrition Deficits  Goal: Optimize nutrtional status  6/28/2020 0246 by Leilani Martinez RN  Outcome: Met This Shift     Problem: Risk for Fluid Volume Deficit  Goal: Maintain normal heart rhythm  6/28/2020 0246 by Leilani Martinez RN  Outcome: Met This Shift     Problem: Risk for Fluid Volume Deficit  Goal: Maintain absence of muscle cramping  6/28/2020 0246 by Leilani Martinez RN  Outcome: Met This Shift     Problem: Risk for Fluid Volume Deficit  Goal: Maintain normal serum potassium, sodium, calcium, phosphorus, and pH  6/28/2020 0246 by Leilani Martinez RN  Outcome: Met This Shift     Problem: Loneliness or Risk for Loneliness  Goal: Demonstrate positive use of time alone when socialization is not possible  6/28/2020 0246 by Gracia Marcano RN  Outcome: Met This Shift     Problem: Loneliness or Risk for Loneliness  Goal: Demonstrate positive use of time alone when socialization is not possible  6/28/2020 0246 by Gracia Marcano RN  Outcome: Met This Shift     Problem: Fatigue  Goal: Verbalize increase energy and improved vitality  6/28/2020 0246 by Gracia Marcano RN  Outcome: Met This Shift     Problem: Patient Education: Go to Patient Education Activity  Goal: Patient/Family Education  6/28/2020 0246 by Gracia Marcano RN  Outcome: Met This Shift

## 2020-06-28 NOTE — PROGRESS NOTES
Nutrition Assessment    Type and Reason for Visit: Initial, Positive Nutrition Screen    Nutrition Recommendations: Recommend continue Ensure Enlive QID, if meal intake >50% recommend reduce to BID. Nutrition Assessment: Pt is at risk for nutritional compromise 2/2 pt report of poor intake for 4-5 days PTA, however pt consuming >75% of all recorded intake since admission. Pt admitted w/ COVID-19. Will continue current ONS and monitor. Malnutrition Assessment:  · Malnutrition Status: Insufficient data  · Context: Acute illness or injury  · Findings of the 6 clinical characteristics of malnutrition (Minimum of 2 out of 6 clinical characteristics is required to make the diagnosis of moderate or severe Protein Calorie Malnutrition based on AND/ASPEN Guidelines):  1. Energy Intake-Less than or equal to 75% of estimated energy requirement, Greater than or equal to 5 days    2. Weight Loss-Unable to assess(no EMR wt hx), unable to assess  3. Fat Loss-Unable to assess(COVID-19 ),    4. Muscle Loss-Unable to assess,    5. Fluid Accumulation-No significant fluid accumulation,    6.  Strength-Not measured    Nutrition Risk Level:  Moderate    Nutrient Needs:  · Estimated Daily Total Kcal: 8453-5514(18-19 kcal/kg CBW)  · Estimated Daily Protein (g): (1.3-1.5 gm/kg IBW)  · Estimated Daily Total Fluid (ml/day): 3647-5897(1 ml/kcal)    Nutrition Diagnosis:   · Problem: Inadequate oral intake  · Etiology: related to Impaired respiratory function-inability to consume food     Signs and symptoms:  as evidenced by Patient report of, Diet history of poor intake    Objective Information:  · Nutrition-Focused Physical Findings: abd WDL, +BS, I/O WNL, no edema  · Wound Type: None  · Current Nutrition Therapies:  · Oral Diet Orders: General   · Oral Diet intake: Unable to assess(no meal intake recorded)  · Oral Nutrition Supplement (ONS) Orders: Standard High Calorie Oral Supplement(QID)  · ONS intake: %(all

## 2020-06-28 NOTE — PROGRESS NOTES
Patient wanted a shower, guards stated he would have to use the wipes, patient was unable to be released from all restraints per policy for showerNancy Rebolledo

## 2020-06-28 NOTE — PROGRESS NOTES
Internal Medicine Progress Note    [facility]    Wen Yi. Layton Mensah., ROSHNI.JUAN.JESSICAONancyI. Gabrielle Fontaine D.O., MATTHEW Douglas D.O. Lea Almodovar, MSN, APRN, NP-C  Onofre Narayan. Lindalou Goldberg, MSN, APRN-CNP     Primary Care Physician: Rio Samano DO   Admitting Physician:  Lia Lemons DO  Admission date and time: 6/26/2020 10:20 AM    Room:  [facility]9972-81  Admitting diagnosis: COVID-19 virus infection [U07.1]    Patient Name: Lolly Tierney  MRN: [id number]    Date of Service: 6/28/2020     Subjective:    Earnestine Lorenzo is a 27 y. o.  male who was seen and examined today,6/28/2020, at the bedside. Patient states his urine is still dark red/maroon. Denies dysuria or any flank pain. Respiratory wise currently denies any shortness of breath or coughing. No furtehr chest discomfort . Asking for a shower. Guards present during my examination. Review of System:   Constitutional:   Denies fever or chills, weight loss or gain, fatigue or malaise. HEENT:   Denies ear pain, sore throat, sinus or eye problems. Cardiovascular:   Denies any chest pain, irregular heartbeats, or palpitations. Respiratory:   Denies shortness of breath, coughing, sputum production, hemoptysis, or wheezing. Gastrointestinal:   Denies nausea, vomiting, diarrhea, or constipation. Denies any abdominal pain. Genitourinary:    Denies any urgency, frequency. Voiding  without difficulty. See subjective  Extremities:   Denies lower extremity swelling, edema or cyanosis. Neurology:    Denies any headache or focal neurological deficits, Denies generalized weakness or memory difficulty. Psch:   Denies being anxious or depressed. Musculoskeletal:    Denies  myalgias, joint complaints or back pain. Integumentary:   Denies any rashes, ulcers, or excoriations. Denies bruising. Multiple tattoos  Hematologic/Lymphatic:  Denies bruising or bleeding.     Physical Exam:  I/O this shift:  In: -   Out: 350 [Urine:350]    Intake/Output Summary (Last 24 hours) at 6/28/2020 1251  Last data filed at 6/28/2020 0930  Gross per 24 hour   Intake 2227 ml   Output 1350 ml   Net 877 ml   I/O last 3 completed shifts: In: 2227 [P.O.:480; I.V.:1747]  Out: 1600 [Urine:1600]  Patient Vitals for the past 96 hrs (Last 3 readings):   Weight   06/26/20 1800 206 lb (93.4 kg)   06/26/20 1034 206 lb 14.4 oz (93.8 kg)       Vital Signs:   Blood pressure 127/77, pulse 83, temperature 97.5 °F (36.4 °C), temperature source Oral, resp. rate 18, height 5' 9\" (1.753 m), weight 206 lb (93.4 kg), SpO2 96 %. Nickie Jaimes is a 27 y. o.  male who is alert, responsive, oriented to person, place, and time. General appearance:   Well preserved, alert, no distress. Head:  Normocephalic. No masses, lesions or tenderness. Eyes:  PERRLA. EOMI. Sclera clear. Buccal mucosa moist.  ENT:  Ears normal. Mucosa normal.  Neck:    Supple. Trachea midline. No thyromegaly. No JVD. No bruits. Heart:    Rhythm regular. Rate controlled. No murmurs. Lungs:    Symmetrical. Clear to auscultation bilaterally. No wheezes. No rhonchi. No rales. Abdomen:   Soft. Non-tender. Non-distended. Bowel sounds positive. No organomegaly or masses. No pain on palpation. Extremities:    Peripheral pulses present. No peripheral edema. No ulcers. No cyanosis. No clubbing. Neurologic:    Alert x 3. No focal deficit. Cranial nerves grossly intact. No focal weakness. Psych:   Behavior is normal. Mood appears normal. Speech is not rapid and/or pressured. Musculoskeletal:   Spine ROM normal. Muscular strength intact. Gait not assessed. Integumentary:  No rashes  Skin normal color and texture.   Multiple tattoos  Genitalia/Breast:  Deferred      Allergy:  Allergies   Allergen Reactions    Pcn [Penicillins] Hives        Medication:  Scheduled Meds:   vitamin C  500 mg Oral Daily    vitamin B-6  50 mg Oral Daily    zinc sulfate  50 mg Oral History:   Diagnosis Date    Hyperlipidemia      Active Problems:    COVID-19 virus infection  Resolved Problems:    * No resolved hospital problems. *      Assessment:    · Myoglobinuria secondary to nontraumatic rhabdomyolysis likely related to COVID 19 infection  · COVID-19 virus infection  · Acute kidney injury  · Thrombocytopenia  · Anemia  · Proteinuria  · Anorexia  · Leukopenia    Plan:   Patient has been afebrile. In general feels better but fatigued. No respiratory complaints. Dietary intake overall better. Drinking protein supplement. We will continue IV hydration. Kidney function improved. CK slowly trending downward. Urine still discolored due to myoglobinuria. Monitor respiratory status closely. Nephrology recommendations have been reviewed. Will obtain a CT of the abdomen and pelvis to evaluate for pathology secondary to abnormal lab values. Await HIV and hepatitis results. Monitor labs. Activity as permitted/tolerated. May shower. I reviewed the patient's past medical, surgical history and medication. Patient's medications were reviewed/continued/adjusted. Labs as ordered. Please see orders for further plan of care. Rhythm strips reviewed as well as consultant recommendations/notes and/or discussion. I reviewed the  course of events since last visit. More than 50% of my  time was spent at the bedside counseling and/or coordination of care with the patient and/or family with face to face contact. This time was spent reviewing notes and laboratory data, instructing and counseling the patient. Time I spent with the family or surrogate(s) is included only if the patient was incapable of providing the necessary information or participating in medical decisionsI also discussed the differential diagnosis and all of the proposed management plans with the patient and individuals accompanying the patient.     Linde Landau requires this high level of physician care and nursing in the [facility] due

## 2020-06-28 NOTE — PLAN OF CARE
Problem: Airway Clearance - Ineffective  Goal: Achieve or maintain patent airway  6/28/2020 1137 by Caryn Cintron RN  Outcome: Met This Shift  6/28/2020 0246 by Asher Vidal RN  Outcome: Met This Shift     Problem: Gas Exchange - Impaired  Goal: Absence of hypoxia  6/28/2020 1137 by Caryn Cintron RN  Outcome: Met This Shift  6/28/2020 0246 by Asher Vidal RN  Outcome: Met This Shift     Problem: Gas Exchange - Impaired  Goal: Promote optimal lung function  6/28/2020 1137 by Caryn Cintron RN  Outcome: Met This Shift  6/28/2020 0246 by Asher Vidal RN  Outcome: Met This Shift     Problem: Breathing Pattern - Ineffective  Goal: Ability to achieve and maintain a regular respiratory rate  6/28/2020 1137 by Caryn Cintron RN  Outcome: Met This Shift  6/28/2020 0246 by Asher Vidal RN  Outcome: Met This Shift     Problem: Body Temperature -  Risk of, Imbalanced  Goal: Ability to maintain a body temperature within defined limits  6/28/2020 1137 by Caryn Cintron RN  Outcome: Met This Shift  6/28/2020 0246 by Asher Vidal RN  Outcome: Met This Shift     Problem: Body Temperature -  Risk of, Imbalanced  Goal: Will regain or maintain usual level of consciousness  6/28/2020 1137 by Caryn Cintron RN  Outcome: Met This Shift  6/28/2020 0246 by Asher Vidal RN  Outcome: Met This Shift     Problem:  Body Temperature -  Risk of, Imbalanced  Goal: Complications related to the disease process, condition or treatment will be avoided or minimized  6/28/2020 1137 by Caryn Cintron RN  Outcome: Met This Shift  6/28/2020 0246 by Asher Vidal RN  Outcome: Met This Shift     Problem: Isolation Precautions - Risk of Spread of Infection  Goal: Prevent transmission of infection  6/28/2020 1137 by Caryn Cintron RN  Outcome: Met This Shift  6/28/2020 0246 by Asher Vidal RN  Outcome: Met This Shift     Problem: Nutrition Deficits  Goal: Optimize nutrtional status  6/28/2020 1137 by Caryn Cintron RN  Outcome: Met This Shift  6/28/2020 0246 by Cassie Childers RN  Outcome: Met This Shift

## 2020-06-29 LAB
ALBUMIN SERPL-MCNC: 3.5 G/DL (ref 3.5–5.2)
ALP BLD-CCNC: 34 U/L (ref 40–129)
ALT SERPL-CCNC: 139 U/L (ref 0–40)
ANION GAP SERPL CALCULATED.3IONS-SCNC: 10 MMOL/L (ref 7–16)
ANION GAP SERPL CALCULATED.3IONS-SCNC: 9 MMOL/L (ref 7–16)
AST SERPL-CCNC: 234 U/L (ref 0–39)
ATYPICAL LYMPHOCYTE RELATIVE PERCENT: 3 % (ref 0–4)
BASOPHILS ABSOLUTE: 0.03 E9/L (ref 0–0.2)
BASOPHILS RELATIVE PERCENT: 1 % (ref 0–2)
BILIRUB SERPL-MCNC: 1.8 MG/DL (ref 0–1.2)
BUN BLDV-MCNC: 11 MG/DL (ref 6–20)
BUN BLDV-MCNC: 8 MG/DL (ref 6–20)
BURR CELLS: ABNORMAL
CALCIUM SERPL-MCNC: 8.7 MG/DL (ref 8.6–10.2)
CALCIUM SERPL-MCNC: 8.8 MG/DL (ref 8.6–10.2)
CHLORIDE BLD-SCNC: 103 MMOL/L (ref 98–107)
CHLORIDE BLD-SCNC: 104 MMOL/L (ref 98–107)
CO2: 27 MMOL/L (ref 22–29)
CO2: 28 MMOL/L (ref 22–29)
CREAT SERPL-MCNC: 1.1 MG/DL (ref 0.7–1.2)
CREAT SERPL-MCNC: 1.2 MG/DL (ref 0.7–1.2)
EOSINOPHILS ABSOLUTE: 0.1 E9/L (ref 0.05–0.5)
EOSINOPHILS RELATIVE PERCENT: 4 % (ref 0–6)
GFR AFRICAN AMERICAN: >60
GFR AFRICAN AMERICAN: >60
GFR NON-AFRICAN AMERICAN: >60 ML/MIN/1.73
GFR NON-AFRICAN AMERICAN: >60 ML/MIN/1.73
GLUCOSE BLD-MCNC: 108 MG/DL (ref 74–99)
GLUCOSE BLD-MCNC: 128 MG/DL (ref 74–99)
HAV IGM SER IA-ACNC: NORMAL
HCT VFR BLD CALC: 34.3 % (ref 37–54)
HEMOGLOBIN: 10.7 G/DL (ref 12.5–16.5)
HEPATITIS B CORE IGM ANTIBODY: NORMAL
HEPATITIS B SURFACE ANTIGEN INTERPRETATION: NORMAL
HEPATITIS C ANTIBODY INTERPRETATION: NORMAL
HIV-1 AND HIV-2 ANTIBODIES: NORMAL
LYMPHOCYTES ABSOLUTE: 0.83 E9/L (ref 1.5–4)
LYMPHOCYTES RELATIVE PERCENT: 30 % (ref 20–42)
MAGNESIUM: 1.8 MG/DL (ref 1.6–2.6)
MCH RBC QN AUTO: 31.4 PG (ref 26–35)
MCHC RBC AUTO-ENTMCNC: 31.2 % (ref 32–34.5)
MCV RBC AUTO: 100.6 FL (ref 80–99.9)
MONOCYTES ABSOLUTE: 0.3 E9/L (ref 0.1–0.95)
MONOCYTES RELATIVE PERCENT: 12 % (ref 2–12)
MYELOCYTE PERCENT: 1 % (ref 0–0)
NEUTROPHILS ABSOLUTE: 1.25 E9/L (ref 1.8–7.3)
NEUTROPHILS RELATIVE PERCENT: 49 % (ref 43–80)
PDW BLD-RTO: 11.6 FL (ref 11.5–15)
PHOSPHORUS: 3 MG/DL (ref 2.5–4.5)
PLATELET # BLD: 188 E9/L (ref 130–450)
PMV BLD AUTO: 10.7 FL (ref 7–12)
POIKILOCYTES: ABNORMAL
POTASSIUM REFLEX MAGNESIUM: 4.6 MMOL/L (ref 3.5–5)
POTASSIUM SERPL-SCNC: 4.4 MMOL/L (ref 3.5–5)
RBC # BLD: 3.41 E12/L (ref 3.8–5.8)
SODIUM BLD-SCNC: 140 MMOL/L (ref 132–146)
SODIUM BLD-SCNC: 141 MMOL/L (ref 132–146)
TOTAL CK: 831 U/L (ref 20–200)
TOTAL PROTEIN: 6.4 G/DL (ref 6.4–8.3)
URINE CULTURE, ROUTINE: NORMAL
WBC # BLD: 2.5 E9/L (ref 4.5–11.5)

## 2020-06-29 PROCEDURE — 82550 ASSAY OF CK (CPK): CPT

## 2020-06-29 PROCEDURE — 2580000003 HC RX 258: Performed by: NURSE PRACTITIONER

## 2020-06-29 PROCEDURE — 83735 ASSAY OF MAGNESIUM: CPT

## 2020-06-29 PROCEDURE — 36415 COLL VENOUS BLD VENIPUNCTURE: CPT

## 2020-06-29 PROCEDURE — 80048 BASIC METABOLIC PNL TOTAL CA: CPT

## 2020-06-29 PROCEDURE — 85025 COMPLETE CBC W/AUTO DIFF WBC: CPT

## 2020-06-29 PROCEDURE — 6370000000 HC RX 637 (ALT 250 FOR IP): Performed by: SPECIALIST

## 2020-06-29 PROCEDURE — 84100 ASSAY OF PHOSPHORUS: CPT

## 2020-06-29 PROCEDURE — 1200000000 HC SEMI PRIVATE

## 2020-06-29 PROCEDURE — 80053 COMPREHEN METABOLIC PANEL: CPT

## 2020-06-29 RX ADMIN — ZINC SULFATE 220 MG (50 MG) CAPSULE 50 MG: CAPSULE at 08:17

## 2020-06-29 RX ADMIN — SODIUM CHLORIDE: 9 INJECTION, SOLUTION INTRAVENOUS at 00:52

## 2020-06-29 RX ADMIN — SODIUM CHLORIDE: 9 INJECTION, SOLUTION INTRAVENOUS at 11:04

## 2020-06-29 RX ADMIN — SODIUM CHLORIDE: 9 INJECTION, SOLUTION INTRAVENOUS at 22:01

## 2020-06-29 RX ADMIN — Medication 500 MG: at 08:16

## 2020-06-29 RX ADMIN — PYRIDOXINE HCL TAB 50 MG 50 MG: 50 TAB at 08:16

## 2020-06-29 ASSESSMENT — PAIN SCALES - GENERAL
PAINLEVEL_OUTOF10: 0

## 2020-06-29 NOTE — PROGRESS NOTES
Internal Medicine Progress Note    [facility]    Margolalo Dulce. Jeffery Fenton., F.A.C.O.I. Amanda Boston D.O., CARMINACNancyOBRIANNE Schwab D.O. Anoop Ordoñez, MSN, APRN, NP-C  Kamini Thompson. Jane Fitzgerald, MSN, APRN-CNP     Primary Care Physician: Angy Theodore DO   Admitting Physician:  Rangel Villagomez DO  Admission date and time: 6/26/2020 10:20 AM    Room:  [facility]8737-92  Admitting diagnosis: COVID-19 virus infection [U07.1]    Patient Name: Linsey Campoverde  MRN: [id number]    Date of Service: 6/29/2020     Subjective:  Samreen Kapoor is a 27 y. o.  male who was seen and examined today,6/29/2020, at the bedside. Samreen Kapoor was discussed extensively with the nursing staff today as a face-to-face examination was not performed. The patient is being followed by multiple subspecialist.  His nasal cannula oxygen has been weaned off and his respiratory status is stable. He denies any overt pain or discomfort. He seems to be improving as expected. Review of System:   Constitutional:   Denies fever or chills, weight loss or gain, fatigue or malaise. HEENT:   Denies ear pain, sore throat, sinus or eye problems. Cardiovascular:   Denies any chest pain, irregular heartbeats, or palpitations. Respiratory:   Denies shortness of breath, coughing, sputum production, hemoptysis, or wheezing. Gastrointestinal:   Denies nausea, vomiting, diarrhea, or constipation. Denies any abdominal pain. Genitourinary:    Denies any urgency, frequency. Voiding  without difficulty. See subjective  Extremities:   Denies lower extremity swelling, edema or cyanosis. Neurology:    Denies any headache or focal neurological deficits, Denies generalized weakness or memory difficulty. Psch:   Denies being anxious or depressed. Musculoskeletal:    Denies  myalgias, joint complaints or back pain. Integumentary:   Denies any rashes, ulcers, or excoriations. Denies bruising.   Multiple tattoos  Hematologic/Lymphatic:  Denies bruising or bleeding. Physical Exam:  I/O this shift:  In: -   Out: 1000 [Urine:1000]    Intake/Output Summary (Last 24 hours) at 6/29/2020 1212  Last data filed at 6/29/2020 1109  Gross per 24 hour   Intake 2807.33 ml   Output 2850 ml   Net -42.67 ml   I/O last 3 completed shifts: In: 2807.3 [P.O.:480; I.V.:2327.3]  Out: 2200 [Urine:2200]  Patient Vitals for the past 96 hrs (Last 3 readings):   Weight   06/26/20 1800 206 lb (93.4 kg)   06/26/20 1034 206 lb 14.4 oz (93.8 kg)       Vital Signs:   Blood pressure 133/74, pulse 81, temperature 97.8 °F (36.6 °C), temperature source Oral, resp. rate 19, height 5' 9\" (1.753 m), weight 206 lb (93.4 kg), SpO2 95 %. Hilton Hernandez is a 27 y. o.  male who is alert, responsive, oriented to person, place, and time. General appearance:   Well preserved, alert, no distress. Head:  Normocephalic. No masses, lesions or tenderness. Eyes:  PERRLA. EOMI. Sclera clear. Buccal mucosa moist.  ENT:  Ears normal. Mucosa normal.  Neck:    Supple. Trachea midline. No thyromegaly. No JVD. No bruits. Heart:    Rhythm regular. Rate controlled. No murmurs. Lungs:    Symmetrical. Clear to auscultation bilaterally. No wheezes. No rhonchi. No rales. Abdomen:   Soft. Non-tender. Non-distended. Bowel sounds positive. No organomegaly or masses. No pain on palpation. Extremities:    Peripheral pulses present. No peripheral edema. No ulcers. No cyanosis. No clubbing. Neurologic:    Alert x 3. No focal deficit. Cranial nerves grossly intact. No focal weakness. Psych:   Behavior is normal. Mood appears normal. Speech is not rapid and/or pressured. Musculoskeletal:   Spine ROM normal. Muscular strength intact. Gait not assessed. Integumentary:  No rashes  Skin normal color and texture.   Multiple tattoos  Genitalia/Breast:  Deferred      Allergy:  Allergies   Allergen Reactions    Pcn [Penicillins] Hives        Medication:  Scheduled Meds:   vitamin C  500 mg Oral Daily    vitamin B-6  50 mg Oral Daily    zinc sulfate  50 mg Oral Daily    docusate sodium  100 mg Oral Daily     Continuous Infusions:   sodium chloride 100 mL/hr at 06/29/20 1104       Objective Data:  CBC:   Recent Labs     06/27/20  0715 06/28/20  0800   WBC 2.2* 2.0*   HGB 11.4* 10.9*   * 150     BMP:    Recent Labs     06/27/20  0715 06/28/20  0800 06/29/20  0657    141 141   K 4.7 4.6 4.4    104 104   CO2 26 27 28   BUN 12 10 8   CREATININE 1.4* 1.1 1.1   GLUCOSE 109* 119* 128*     CMP:    Lab Results   Component Value Date     06/29/2020    K 4.4 06/29/2020    K 3.8 06/26/2020     06/29/2020    CO2 28 06/29/2020    BUN 8 06/29/2020    CREATININE 1.1 06/29/2020    GFRAA >60 06/29/2020    LABGLOM >60 06/29/2020    GLUCOSE 128 06/29/2020    PROT 6.3 06/27/2020    LABALBU 3.2 06/27/2020    CALCIUM 8.7 06/29/2020    BILITOT 1.8 06/27/2020    ALKPHOS 34 06/27/2020    AST 89 06/27/2020    ALT 29 06/27/2020     Hepatic:   Recent Labs     06/27/20  0715   AST 89*   ALT 29   BILITOT 1.8*   ALKPHOS 34*     Troponin:   No results for input(s): TROPONINI in the last 72 hours. BNP: No results for input(s): BNP in the last 72 hours. Lipids:   Recent Labs     06/27/20  0715   CHOL 161   HDL 28     ABGs: No results found for: PHART, PO2ART, PFT7HIS  INR:   Recent Labs     06/28/20  0800   INR 1.2   PROTIME 13.6*     RAD: Xr Chest Portable    Result Date: 6/26/2020  EXAMINATION: ONE XRAY VIEW OF THE CHEST 6/26/2020 11:04 am COMPARISON: None. HISTORY: ORDERING SYSTEM PROVIDED HISTORY: covid, sob TECHNOLOGIST PROVIDED HISTORY: Reason for exam:->covid, sob FINDINGS: Patchy areas of hazy consolidation in medial right lung base and peripheral left lung base. No pneumothorax or pleural effusion. Heart size is normal.     Patchy areas of basilar consolidation compatible with COVID-19 pneumonia. Assessment:  1.  Acute COVID-19 infection resulting in nontraumatic rhabdomyolysis with ongoing improvement  2. Acute renal failure with resolution  3. Pancytopenia related to myelosuppression with associated lymphopenia related to COVID infection    Plan:   The patient's respiratory status remains stable at this point. Renal dysfunction has resolved following aggressive IV fluid resuscitation. Inflammatory markers are being monitored accordingly. Atypical infectious processes are being worked up. The infectious disease team continues to follow and I have discussed the case with them. Greater than 40 minutes of critical care time was spent with the patient. This time included chart review, , and discussion with those consultants involved in the patient's care. More than 50% of my  time was spent at the bedside counseling and/or coordination of care with the patient and/or family with face to face contact. This time was spent reviewing notes and laboratory data, instructing and counseling the patient. Time I spent with the family or surrogate(s) is included only if the patient was incapable of providing the necessary information or participating in medical decisionsI also discussed the differential diagnosis and all of the proposed management plans with the patient and individuals accompanying the patient. Meagan Peña requires this high level of physician care and nursing in the [address] due the complexity of decision management and chance of rapid decline or death. Continued cardiac monitoring and higher level of nursing are required. I am ready available for decision making and intervention. I reviewed the relevant imaging studies and available reports. I also discussed the differential diagnosis and all of the proposed management plans with the patient and individuals accompanying the patient to this visit. I reviewed the relevant imaging studies and available reports.         Margarito Lassiter DO,   On 6/29/2020  12:12 PM

## 2020-06-29 NOTE — PLAN OF CARE
Problem: Airway Clearance - Ineffective  Goal: Achieve or maintain patent airway  Outcome: Met This Shift     Problem: Gas Exchange - Impaired  Goal: Absence of hypoxia  Outcome: Met This Shift     Problem: Gas Exchange - Impaired  Goal: Promote optimal lung function  Outcome: Met This Shift     Problem: Breathing Pattern - Ineffective  Goal: Ability to achieve and maintain a regular respiratory rate  Outcome: Met This Shift     Problem: Body Temperature -  Risk of, Imbalanced  Goal: Ability to maintain a body temperature within defined limits  Outcome: Met This Shift     Problem: Body Temperature -  Risk of, Imbalanced  Goal: Will regain or maintain usual level of consciousness  Outcome: Met This Shift     Problem:  Body Temperature -  Risk of, Imbalanced  Goal: Complications related to the disease process, condition or treatment will be avoided or minimized  Outcome: Met This Shift     Problem: Isolation Precautions - Risk of Spread of Infection  Goal: Prevent transmission of infection  Outcome: Met This Shift     Problem: Nutrition Deficits  Goal: Optimize nutrtional status  Outcome: Met This Shift     Problem: Risk for Fluid Volume Deficit  Goal: Maintain normal heart rhythm  Outcome: Met This Shift     Problem: Risk for Fluid Volume Deficit  Goal: Maintain absence of muscle cramping  Outcome: Met This Shift     Problem: Risk for Fluid Volume Deficit  Goal: Maintain normal serum potassium, sodium, calcium, phosphorus, and pH  Outcome: Met This Shift     Problem: Loneliness or Risk for Loneliness  Goal: Demonstrate positive use of time alone when socialization is not possible  Outcome: Met This Shift     Problem: Fatigue  Goal: Verbalize increase energy and improved vitality  Outcome: Met This Shift     Problem: Patient Education: Go to Patient Education Activity  Goal: Patient/Family Education  Outcome: Met This Shift

## 2020-06-29 NOTE — CARE COORDINATION
SOCIAL WORK / DISCHARGE PLANNING:  COVID positive. [employer] inmate. Dc plan to return to correctional facility. Room air currently. ID continuing to complete tests. ACP done.                  Electronically signed by IVANIA Milton on 6/29/2020 at 12:30 PM

## 2020-06-29 NOTE — CARE COORDINATION
6-29- Cm note: pt is a [facility] inmate, plan is to return to facility when stable.  Electronically signed by Randolph Cabello RN on 6/29/2020 at 2:11 PM

## 2020-06-29 NOTE — PROGRESS NOTES
[facility] PROGRESS NOTE    F/u COVID-19    FACE TO FACE was done   All relevant records and diagnostic tests were reviewed, including laboratory results and imaging. Please reference any relevant documentation elsewhere    SUBJECTIVE:  Alina Wilson, 27 y.o., male   Remains off o2  nad  He is eating   Pt was discussed with care team/renal    ROS:GENERAL-             GENERAL:Temperature:  Current - Temp: 97.8 °F (36.6 °C);  Max - Temp  Av.8 °F (36.6 °C)  Min: 97.6 °F (36.4 °C)  Max: 98.3 °F (36.8 °C)  Respiratory Rate : Resp  Av.2  Min: 18  Max: 20  Pulse Range: Pulse  Av.8  Min: 80  Max: 140  Blood Pressure Range:  Systolic (14WFI), NLB:648 , Min:116 , SLI:427   ; Diastolic (78ZPA), KND:11, Min:72, Max:83    Pulse ox Range: SpO2  Av.5 %  Min: 95 %  Max: 96 %  24hr I & O:      Intake/Output Summary (Last 24 hours) at 2020 1339  Last data filed at 2020 1238  Gross per 24 hour   Intake 3147.33 ml   Output 2450 ml   Net 697.33 ml       CONSTITUTIONAL: AWAKE ALERT NAD  HEENT: at/nc  LUNGS:clear to ant B  CARDIOVASCULAR:  S1 and S2   ABDOMEN:  normal bowel sounds, non-distended, non-tender   EXTREMITIES: FROM no rash   SKIN:    tattoos    MEDS:  vitamin C (ASCORBIC ACID) tablet 500 mg, Daily  vitamin B-6 (PYRIDOXINE) tablet 50 mg, Daily  zinc sulfate (ZINCATE) capsule 50 mg, Daily  docusate sodium (COLACE) capsule 100 mg, Daily  0.9 % sodium chloride infusion, Continuous  acetaminophen (TYLENOL) tablet 650 mg, Q4H PRN          Data:  Lab Results   Component Value Date    COVID19 DETECTED 2020     COVID-19/BALA-COV2 LABS  Recent Labs     20  0715 20  0800   CRP  --  1.2*   PROCAL  --  0.08   FERRITIN  --  5,334   LDH  --  1,864*   DDIMER SEE BELOW* 1666   FIBRINOGEN 562* 508   INR  --  1.2   PROTIME  --  13.6*   AST 89*  --    ALT 29  --    TRIG 154*  --      Lab Results   Component Value Date    CHOL 161 2020

## 2020-06-29 NOTE — PROGRESS NOTES
Progress Note  6/29/2020 1:10 PM  Subjective:   Admit Date: 6/26/2020  PCP: Adonay Downing DO  Interval History: patient examined , doing well feels ok     Diet: DIET GENERAL;  Dietary Nutrition Supplements: Standard High Calorie Oral Supplement    Data:   Scheduled Meds:   vitamin C  500 mg Oral Daily    vitamin B-6  50 mg Oral Daily    zinc sulfate  50 mg Oral Daily    docusate sodium  100 mg Oral Daily     Continuous Infusions:   sodium chloride 100 mL/hr at 06/29/20 1104     PRN Meds:acetaminophen  I/O last 3 completed shifts: In: 2807.3 [P.O.:480; I.V.:2327.3]  Out: 2200 [Urine:2200]  I/O this shift:  In: 340 [P.O.:340]  Out: 1000 [Urine:1000]    Intake/Output Summary (Last 24 hours) at 6/29/2020 1310  Last data filed at 6/29/2020 1238  Gross per 24 hour   Intake 3147.33 ml   Output 2450 ml   Net 697.33 ml     CBC:   Recent Labs     06/27/20  0715 06/28/20  0800   WBC 2.2* 2.0*   HGB 11.4* 10.9*   * 150     BMP:    Recent Labs     06/27/20  0715 06/28/20  0800 06/29/20  0657    141 141   K 4.7 4.6 4.4    104 104   CO2 26 27 28   BUN 12 10 8   CREATININE 1.4* 1.1 1.1   GLUCOSE 109* 119* 128*     Hepatic:   Recent Labs     06/27/20  0715   AST 89*   ALT 29   BILITOT 1.8*   ALKPHOS 34*     Troponin: No results for input(s): TROPONINI in the last 72 hours. BNP: No results for input(s): BNP in the last 72 hours. Lipids:   Recent Labs     06/27/20  0715   CHOL 161   HDL 28     ABGs: No results found for: PHART, PO2ART, WIW5XII  INR:   Recent Labs     06/28/20  0800   INR 1.2       -----------------------------------------------------------------  RAD: Xr Chest Portable    Result Date: 6/26/2020  EXAMINATION: ONE XRAY VIEW OF THE CHEST 6/26/2020 11:04 am COMPARISON: None. HISTORY: ORDERING SYSTEM PROVIDED HISTORY: covid, sob TECHNOLOGIST PROVIDED HISTORY: Reason for exam:->covid, sob FINDINGS: Patchy areas of hazy consolidation in medial right lung base and peripheral left lung base.   No pneumothorax or pleural effusion. Heart size is normal.     Patchy areas of basilar consolidation compatible with COVID-19 pneumonia. Us Abdomen Limited Specify Organ? Liver, Gallbladder    Result Date: 6/27/2020  EXAMINATION: RIGHT UPPER QUADRANT ULTRASOUND 6/27/2020 6:24 pm COMPARISON: None. HISTORY: ORDERING SYSTEM PROVIDED HISTORY: INC LFT TECHNOLOGIST PROVIDED HISTORY: Reason for exam:->INC LFT Specify organ?->LIVER Specify organ?->GALLBLADDER FINDINGS: LIVER:  The liver demonstrates normal echogenicity without evidence of intrahepatic biliary ductal dilatation. BILIARY SYSTEM:  Gallbladder is unremarkable without evidence of pericholecystic fluid, wall thickening or stones. Negative sonographic Garcia's sign. Common bile duct is within normal limits measuring 3.4 mm. RIGHT KIDNEY: The right kidney is grossly unremarkable without evidence of hydronephrosis. PANCREAS:  Visualized portions of the pancreas are unremarkable. OTHER: No evidence of right upper quadrant ascites. Unremarkable right upper quadrant ultrasound. Objective:   Vitals: /74   Pulse 81   Temp 97.8 °F (36.6 °C) (Oral)   Resp 19   Ht 5' 9\" (1.753 m)   Wt 206 lb (93.4 kg)   SpO2 95%   BMI 30.42 kg/m²   General appearance: appears stated age   Skin:  No rashes or lesions  HEENT: Head: Normocephalic, no lesions, without obvious abnormality.   Neck: no adenopathy, no carotid bruit, no JVD, supple, symmetrical, trachea midline and thyroid not enlarged, symmetric, no tenderness/mass/nodules  Lungs: clear to auscultation bilaterally  Heart: regular rate and rhythm, S1, S2 normal, no murmur, click, rub or gallop  Abdomen: soft, non-tender; bowel sounds normal; no masses,  no organomegaly  Extremities: extremities normal, atraumatic, no cyanosis or edema  Neurologic: Mental status: Alert, oriented, thought content appropriate    Assessment:   Patient Active Problem List:     COVID-19 virus infection     COVID-19    Plan: IMPRESSION:  1.  Rhabdomyolysis.  Rhabdomyolysis of undetermined etiology.  There  have been reported cases of rhabdomyolysis associated with COVID-19  Infection.       2.  Renal insufficiency.  We do not have any baseline serum creatinine  available for comparison.  We will follow serial electrolytes.  The  patient may have some renal insufficiency and associated use of Bactrim  in a volume depleted state with fever. Creatinine better at 1.1     3. Jolinda Canter may be again a reflection of fever.  We  will need to reestablish the magnitude of proteinuria once the acute  illness is over.     Overall better , continue present care     Thank you for allowing me to participate in the care of this patient.          Mansoor Ivey

## 2020-06-30 VITALS
HEART RATE: 86 BPM | TEMPERATURE: 96.9 F | DIASTOLIC BLOOD PRESSURE: 80 MMHG | RESPIRATION RATE: 20 BRPM | SYSTOLIC BLOOD PRESSURE: 153 MMHG | BODY MASS INDEX: 30.51 KG/M2 | WEIGHT: 206 LBS | HEIGHT: 69 IN | OXYGEN SATURATION: 98 %

## 2020-06-30 LAB
ALBUMIN SERPL-MCNC: 3.4 G/DL (ref 3.5–5.2)
ALP BLD-CCNC: 33 U/L (ref 40–129)
ALT SERPL-CCNC: 168 U/L (ref 0–40)
ANION GAP SERPL CALCULATED.3IONS-SCNC: 8 MMOL/L (ref 7–16)
AST SERPL-CCNC: 231 U/L (ref 0–39)
ATYPICAL LYMPHOCYTE RELATIVE PERCENT: 2.6 % (ref 0–4)
BASOPHILS ABSOLUTE: 0 E9/L (ref 0–0.2)
BASOPHILS RELATIVE PERCENT: 0.4 % (ref 0–2)
BILIRUB SERPL-MCNC: 1.7 MG/DL (ref 0–1.2)
BUN BLDV-MCNC: 10 MG/DL (ref 6–20)
BURR CELLS: ABNORMAL
C-REACTIVE PROTEIN: 0.2 MG/DL (ref 0–0.4)
CALCIUM SERPL-MCNC: 8.7 MG/DL (ref 8.6–10.2)
CHLORIDE BLD-SCNC: 103 MMOL/L (ref 98–107)
CO2: 29 MMOL/L (ref 22–29)
CREAT SERPL-MCNC: 1.1 MG/DL (ref 0.7–1.2)
D DIMER: 3444 NG/ML DDU
EOSINOPHILS ABSOLUTE: 0.08 E9/L (ref 0.05–0.5)
EOSINOPHILS RELATIVE PERCENT: 3.5 % (ref 0–6)
FERRITIN: 3526 NG/ML
FIBRINOGEN: 391 MG/DL (ref 225–540)
GFR AFRICAN AMERICAN: >60
GFR NON-AFRICAN AMERICAN: >60 ML/MIN/1.73
GLUCOSE BLD-MCNC: 111 MG/DL (ref 74–99)
HCT VFR BLD CALC: 34.2 % (ref 37–54)
HEMOGLOBIN: 10.3 G/DL (ref 12.5–16.5)
LACTATE DEHYDROGENASE: 1606 U/L (ref 135–225)
LYMPHOCYTES ABSOLUTE: 0.86 E9/L (ref 1.5–4)
LYMPHOCYTES RELATIVE PERCENT: 36.5 % (ref 20–42)
MAGNESIUM: 1.9 MG/DL (ref 1.6–2.6)
MCH RBC QN AUTO: 30.5 PG (ref 26–35)
MCHC RBC AUTO-ENTMCNC: 30.1 % (ref 32–34.5)
MCV RBC AUTO: 101.2 FL (ref 80–99.9)
METAMYELOCYTES RELATIVE PERCENT: 0.9 % (ref 0–1)
MONOCYTES ABSOLUTE: 0.26 E9/L (ref 0.1–0.95)
MONOCYTES RELATIVE PERCENT: 12.2 % (ref 2–12)
MYELOCYTE PERCENT: 0.9 % (ref 0–0)
NEUTROPHILS ABSOLUTE: 0.99 E9/L (ref 1.8–7.3)
NEUTROPHILS RELATIVE PERCENT: 43.5 % (ref 43–80)
PDW BLD-RTO: 11.4 FL (ref 11.5–15)
PHOSPHORUS: 3.4 MG/DL (ref 2.5–4.5)
PLATELET # BLD: 199 E9/L (ref 130–450)
PMV BLD AUTO: 11.1 FL (ref 7–12)
POIKILOCYTES: ABNORMAL
POLYCHROMASIA: ABNORMAL
POTASSIUM SERPL-SCNC: 4.1 MMOL/L (ref 3.5–5)
RBC # BLD: 3.38 E12/L (ref 3.8–5.8)
SODIUM BLD-SCNC: 140 MMOL/L (ref 132–146)
TOTAL CK: 989 U/L (ref 20–200)
TOTAL PROTEIN: 6.2 G/DL (ref 6.4–8.3)
WBC # BLD: 2.2 E9/L (ref 4.5–11.5)

## 2020-06-30 PROCEDURE — 82728 ASSAY OF FERRITIN: CPT

## 2020-06-30 PROCEDURE — 83615 LACTATE (LD) (LDH) ENZYME: CPT

## 2020-06-30 PROCEDURE — 6370000000 HC RX 637 (ALT 250 FOR IP): Performed by: SPECIALIST

## 2020-06-30 PROCEDURE — 83735 ASSAY OF MAGNESIUM: CPT

## 2020-06-30 PROCEDURE — 85378 FIBRIN DEGRADE SEMIQUANT: CPT

## 2020-06-30 PROCEDURE — 86140 C-REACTIVE PROTEIN: CPT

## 2020-06-30 PROCEDURE — 80053 COMPREHEN METABOLIC PANEL: CPT

## 2020-06-30 PROCEDURE — 84100 ASSAY OF PHOSPHORUS: CPT

## 2020-06-30 PROCEDURE — 85025 COMPLETE CBC W/AUTO DIFF WBC: CPT

## 2020-06-30 PROCEDURE — 82550 ASSAY OF CK (CPK): CPT

## 2020-06-30 PROCEDURE — 85384 FIBRINOGEN ACTIVITY: CPT

## 2020-06-30 PROCEDURE — 36415 COLL VENOUS BLD VENIPUNCTURE: CPT

## 2020-06-30 RX ORDER — ASCORBIC ACID 500 MG
500 TABLET ORAL DAILY
Qty: 30 TABLET | Refills: 3 | Status: SHIPPED | OUTPATIENT
Start: 2020-07-01

## 2020-06-30 RX ORDER — PYRIDOXINE HCL (VITAMIN B6) 50 MG
50 TABLET ORAL DAILY
Qty: 30 TABLET | Refills: 3 | Status: SHIPPED | OUTPATIENT
Start: 2020-07-01

## 2020-06-30 RX ORDER — ZINC SULFATE 50(220)MG
50 CAPSULE ORAL DAILY
Qty: 30 CAPSULE | Refills: 3 | COMMUNITY
Start: 2020-07-01

## 2020-06-30 RX ADMIN — DOCUSATE SODIUM 100 MG: 100 CAPSULE, LIQUID FILLED ORAL at 09:35

## 2020-06-30 RX ADMIN — PYRIDOXINE HCL TAB 50 MG 50 MG: 50 TAB at 09:34

## 2020-06-30 RX ADMIN — Medication 500 MG: at 09:34

## 2020-06-30 RX ADMIN — ZINC SULFATE 220 MG (50 MG) CAPSULE 50 MG: CAPSULE at 09:34

## 2020-06-30 ASSESSMENT — PAIN SCALES - GENERAL: PAINLEVEL_OUTOF10: 0

## 2020-06-30 NOTE — PLAN OF CARE
Problem: Airway Clearance - Ineffective  Goal: Achieve or maintain patent airway  Outcome: Met This Shift     Problem: Gas Exchange - Impaired  Goal: Absence of hypoxia  Outcome: Met This Shift  Goal: Promote optimal lung function  Outcome: Met This Shift     Problem: Breathing Pattern - Ineffective  Goal: Ability to achieve and maintain a regular respiratory rate  Outcome: Met This Shift     Problem:  Body Temperature -  Risk of, Imbalanced  Goal: Ability to maintain a body temperature within defined limits  Outcome: Met This Shift  Goal: Will regain or maintain usual level of consciousness  Outcome: Met This Shift  Goal: Complications related to the disease process, condition or treatment will be avoided or minimized  Outcome: Met This Shift     Problem: Isolation Precautions - Risk of Spread of Infection  Goal: Prevent transmission of infection  Outcome: Met This Shift     Problem: Nutrition Deficits  Goal: Optimize nutrtional status  Outcome: Met This Shift     Problem: Risk for Fluid Volume Deficit  Goal: Maintain normal heart rhythm  Outcome: Met This Shift  Goal: Maintain absence of muscle cramping  Outcome: Met This Shift  Goal: Maintain normal serum potassium, sodium, calcium, phosphorus, and pH  Outcome: Met This Shift     Problem: Loneliness or Risk for Loneliness  Goal: Demonstrate positive use of time alone when socialization is not possible  Outcome: Met This Shift     Problem: Fatigue  Goal: Verbalize increase energy and improved vitality  Outcome: Met This Shift     Problem: Patient Education: Go to Patient Education Activity  Goal: Patient/Family Education  Outcome: Met This Shift

## 2020-06-30 NOTE — DISCHARGE SUMMARY
Internal Medicine Progress Note     [facility]     Harriet Franco. Ronny Collins., KASEY. Nia Canales D.O., MATTHEW Narayan D.O. Wendy Segundo, MSN, APRN, NP-C  Yvrose Rdz. Milan Siu, MSN, APRN-CNP       Internal Medicine  Discharge Summary    NAME: Vandana Gunderson  :  1985  MRN:  [id number]  [address] [facility]  ADMITTED: 2020      DISCHARGED: 20    ADMITTING PHYSICIAN: Harriet Trent DO    CONSULTANT(S):   IP CONSULT TO NEPHROLOGY  IP CONSULT TO INFECTIOUS DISEASES  IP CONSULT TO PHARMACY     ADMITTING DIAGNOSIS:   COVID-19 virus infection [U07.1]     DISCHARGE DIAGNOSES:   1. Acute COVID-19 infection resulting in nontraumatic rhabdomyolysis with ongoing improvement  2. Acute renal failure with resolution  3. Pancytopenia related to myelosuppression with associated lymphopenia related to COVID infection    BRIEF HISTORY OF PRESENT ILLNESS:   This is a 69-year-old -American male who presented to the emergency department from a local alf. Patient states that he did test positive for COVID-19 on approximately Saturday. He has been in the [facility]. States he was having fever but no chills at that time. He admits to mild shortness of breath. Denies headache. No rashes. Denies any musculoskeletal discomfort. Admits to anorexia and has not eaten for the last 4 to 5 days. Is able to drink fluids. Patient states that on approximately Monday of this week he developed gross hematuria and that every time he urinates his urine has been bloody. He denies any trauma. States he has not been hit or fallen. He denies any flank pain or dysuria. Again no chills but does admit to fever but is also positive for COVID-19.     Emergency room course: Blood pressure 133/88, temperature 102.3 degrees, pulse 87, respirations 26, SPO2 97%. X-ray: Patchy areas of basilar consolidation compatible with COVID-19 pneumonia. Urinalysis-revealed large amount of blood in the urine. ABGs were obtained which revealed a pH of 7.542, PCO2 of 24.3, PO2 was 74.3, HCO3 was 20.4. CK was found to be 921. Lactic acid level was 1.4. INR is 1.3 AST was elevated at 94 with ALT normal at 26. Total bili was 1.6 BUN/creatinine were 15 and 1.6 with a GFR of 60. WBCs were 3400, platelet count was 846, hemoglobin M[place]orten were 12 and 36.5. Patient was in need of further evaluation and treatment was therefore admitted to the hospital under the services of Dr. Maya Orosco and Dr. Jalen Baird. LABS[de-identified]  Lab Results   Component Value Date    WBC 2.2 (L) 06/30/2020    HGB 10.3 (L) 06/30/2020    HCT 34.2 (L) 06/30/2020     06/30/2020     06/30/2020    K 4.1 06/30/2020     06/30/2020    CREATININE 1.1 06/30/2020    BUN 10 06/30/2020    CO2 29 06/30/2020    GLUCOSE 111 (H) 06/30/2020     (H) 06/30/2020     (H) 06/30/2020    INR 1.2 06/28/2020     Lab Results   Component Value Date    INR 1.2 06/28/2020    INR 1.3 06/26/2020    PROTIME 13.6 (H) 06/28/2020    PROTIME 15.3 (H) 06/26/2020      Lab Results   Component Value Date    TSH 1.980 06/27/2020     Lab Results   Component Value Date    TRIG 154 (H) 06/27/2020     Lab Results   Component Value Date    HDL 28 06/27/2020     Lab Results   Component Value Date    LDLCALC 102 (H) 06/27/2020     Lab Results   Component Value Date    LABA1C 4.7 06/27/2020       IMAGING:  Ct Abdomen Pelvis Wo Contrast Additional Contrast? Oral    Result Date: 6/28/2020  EXAMINATION: CT OF THE ABDOMEN AND PELVIS WITHOUT CONTRAST 6/28/2020 5:43 pm TECHNIQUE: CT of the abdomen and pelvis was performed without the administration of intravenous contrast. Multiplanar reformatted images are provided for review. Dose modulation, iterative reconstruction, and/or weight based adjustment of the mA/kV was utilized to reduce the radiation dose to as low as reasonably achievable. COMPARISON: None.  HISTORY: ORDERING SYSTEM PROVIDED HISTORY: Rhabdo, LEXUS, Elevated bili and LDH in the setting of COVID 19 TECHNOLOGIST PROVIDED HISTORY: Reason for exam:->Rhabdo, LEXUS, Elevated bili and LDH in the setting of COVID 19 Additional Contrast?->Oral FINDINGS: Lower Chest: Multifocal patchy airspace disease and ground-glass opacities noted in both lung bases. No evidence of pleural effusion. Organs:  Lack of contrast limits evaluation of the solid organs and bowel. The visualized liver, gallbladder, spleen, pancreas and adrenal glands demonstrate no acute abnormality. No evidence of stones in the kidneys, ureters or bladder. No evidence of hydronephrosis. No evidence of perinephric or periureteral stranding. GI/Bowel: Stomach ileal sweep demonstrate no acute abnormality. There is no evidence of bowel obstruction. No evidence of abnormal bowel wall thickening or distension. The appendix is visualized and is unremarkable. No evidence of acute appendicitis. Pelvis: Bladder and prostate demonstrate no acute abnormality. Peritoneum/Retroperitoneum: No ascites or free air. No lymphadenopathy. Aorta is normal in caliber. Bones/Soft Tissues:  No acute abnormality of the visualized osseous structures. Multifocal ground-glass airspace disease noted in both lung bases. Given patient's history, this likely represents Covid 19 viral pneumonia. No acute abnormality in the abdomen or pelvis. Xr Chest Portable    Result Date: 6/26/2020  EXAMINATION: ONE XRAY VIEW OF THE CHEST 6/26/2020 11:04 am COMPARISON: None. HISTORY: ORDERING SYSTEM PROVIDED HISTORY: covid, sob TECHNOLOGIST PROVIDED HISTORY: Reason for exam:->covid, sob FINDINGS: Patchy areas of hazy consolidation in medial right lung base and peripheral left lung base. No pneumothorax or pleural effusion. Heart size is normal.     Patchy areas of basilar consolidation compatible with COVID-19 pneumonia. Us Abdomen Limited Specify Organ?  Liver, Gallbladder    Result Date: 6/27/2020  EXAMINATION: RIGHT UPPER QUADRANT ULTRASOUND 6/27/2020 6:24 pm COMPARISON: None. HISTORY: ORDERING SYSTEM PROVIDED HISTORY: INC LFT TECHNOLOGIST PROVIDED HISTORY: Reason for exam:->INC LFT Specify organ?->LIVER Specify organ?->GALLBLADDER FINDINGS: LIVER:  The liver demonstrates normal echogenicity without evidence of intrahepatic biliary ductal dilatation. BILIARY SYSTEM:  Gallbladder is unremarkable without evidence of pericholecystic fluid, wall thickening or stones. Negative sonographic Garcia's sign. Common bile duct is within normal limits measuring 3.4 mm. RIGHT KIDNEY: The right kidney is grossly unremarkable without evidence of hydronephrosis. PANCREAS:  Visualized portions of the pancreas are unremarkable. OTHER: No evidence of right upper quadrant ascites. Unremarkable right upper quadrant ultrasound. HOSPITAL COURSE:   Linde Landau did well throughout his hospitalization. He was evaluated by multiple subspecialists including the infectious disease and nephrology teams. The patient's respiratory status remained stable throughout the hospitalization and he did not require nasal cannula oxygen. He was pancytopenic with acute renal insufficiency and rhabdomyolysis. These numbers stabilized and plateaued although still elevated. He was taken off statin therapy. He essentially returned to his baseline with no further complaints. He tolerated IV fluid resuscitation. He became ambulatory and was tolerating a diet. We recommend repeat lab operatory testing on a weekly basis to assure downward trending of liver function studies and inflammatory markers. Otherwise, he is acceptable to return to the shelter facility. BRIEF PHYSICAL EXAMINATION AND LABORATORIES ON DAY OF DISCHARGE:  VITALS:  BP (!) 153/80   Pulse 86   Temp 96.9 °F (36.1 °C) (Temporal)   Resp 20   Ht 5' 9\" (1.753 m)   Wt 206 lb (93.4 kg)   SpO2 98%   BMI 30.42 kg/m²     HEENT:  PERRLA. EOMI. Sclera clear.   Buccal mucosa moist.    Neck:  Supple. Trachea midline. No thyromegaly. No JVD. No bruits. Heart:  Rhythm regular, rate controlled. No murmurs. Lungs:  Symmetrical. Clear to auscultation bilaterally. No wheezes. No rhonchi. No rales. Abdomen: Soft. Non-tender. Non-distended. Bowel sounds positive. No organomegaly or masses. No pain on palpation    Extremities:  Peripheral pulses present. No peripheral edema. No ulcers. Neurologic:  Alert x 3. No focal deficit. Cranial nerves grossly intact. Skin:  No petechia. No hemorrhage. No wounds. DISPOSITION:  The patient's condition is good. At this time the patient is without objective evidence of an acute process requiring continuing hospitalization or inpatient management. They are stable for discharge with outpatient follow-up. I have spoken with the patient and discussed the results of the current hospitalization, in addition to providing specific details for the plan of care and counseling regarding the diagnosis and prognosis. The plan has been discussed in detail and they are aware of the specific conditions for emergent return, as well as the importance of follow-up. Their questions are answered at this time and they are agreeable with the plan for discharge to intermediate    DISCHARGE MEDICATIONS:    [facility]   Home Medication Instructions [facility]:[id number]    Printed on:06/30/20 1002   Medication Information                      vitamin B-6 (B-6) 50 MG tablet  Take 1 tablet by mouth daily             vitamin C (VITAMIN C) 500 MG tablet  Take 1 tablet by mouth daily             zinc sulfate (ZINCATE) 220 (50 Zn) MG capsule  Take 1 capsule by mouth daily                 FOLLOW UP/INSTRUCTIONS:  · This patient is instructed to follow-up with his primary care physician. · Patient is instructed to follow-up with the consults listed above as directed by them.   · he is instructed to resume home medications and take new medications as indicated in the list above. · If the patient has a recurrence of symptoms, he is instructed to go to the ED. Preparing for this patient's discharge, including paperwork, orders, instructions, and meeting with patient did require > 40 minutes.     Margarito Lassiter DO   6/30/2020  10:02 AM

## 2020-06-30 NOTE — PROGRESS NOTES
[facility] PROGRESS NOTE    F/u COVID-19    FACE TO FACE was done PRIOR TO D/C  All relevant records and diagnostic tests were reviewed, including laboratory results and imaging. Please reference any relevant documentation elsewhere    SUBJECTIVE:  Guido Walker, 27 y.o., male   Remains off o2  nad  He is eating AND DRINKING  Pt was discussed with care team/renal/dR [place]   FOR D/C TODAY     ROS:GENERAL-             GENERAL:Temperature:  Current - Temp: 96.9 °F (36.1 °C);  Max - Temp  Av.1 °F (36.7 °C)  Min: 96.9 °F (36.1 °C)  Max: 98.7 °F (37.1 °C)  Respiratory Rate : Resp  Av  Min: 16  Max: 20  Pulse Range: Pulse  Av  Min: 70  Max: 86  Blood Pressure Range:  Systolic (51DDI), LUT:241 , Min:131 , QZS:027   ; Diastolic (12EDU), QPY:37, Min:75, Max:80    Pulse ox Range: SpO2  Av.7 %  Min: 97 %  Max: 98 %  24hr I & O:      Intake/Output Summary (Last 24 hours) at 2020 1302  Last data filed at 2020 0701  Gross per 24 hour   Intake 2626 ml   Output 2150 ml   Net 476 ml       CONSTITUTIONAL: AWAKE ALERT NAD  HEENT: at/nc  LUNGS:clear to ant B NO WHEEZE  CARDIOVASCULAR:  S1 and S2 NO MURMUR  ABDOMEN:  normal bowel sounds, non-distended, non-tender   EXTREMITIES: FROM no rash   SKIN:    Tattoos  PSYCH PLEASANT    MEDS:  vitamin C (ASCORBIC ACID) tablet 500 mg, Daily  vitamin B-6 (PYRIDOXINE) tablet 50 mg, Daily  zinc sulfate (ZINCATE) capsule 50 mg, Daily  docusate sodium (COLACE) capsule 100 mg, Daily  0.9 % sodium chloride infusion, Continuous  acetaminophen (TYLENOL) tablet 650 mg, Q4H PRN          Data:  Lab Results   Component Value Date    COVID19 DETECTED 2020     COVID-19/BALA-COV2 LABS  Recent Labs     20  0800 20  1408 20  0552   CRP 1.2*  --  0.2   PROCAL 0.08  --   --    FERRITIN 5,334  --   --    LDH 1,864*  --  1,606*   DDIMER 9426  --  3444   FIBRINOGEN 508  --  391   INR 1.2  --   --    PROTIME 13.6* --   --    AST  --  234* 231*   ALT  --  139* 168*     Lab Results   Component Value Date    CHOL 161 06/27/2020    TRIG 154 06/27/2020    HDL 28 06/27/2020    LDLCALC 102 06/27/2020    LABVLDL 31 06/27/2020     Recent Labs     06/28/20  0800  06/29/20  1408 06/30/20  0552   WBC 2.0*  --  2.5* 2.2*   HGB 10.9*  --  10.7* 10.3*   HCT 35.5*  --  34.3* 34.2*     --  188 199   MCV 99.4  --  100.6* 101.2*   MCH 30.5  --  31.4 30.5   MCHC 30.7*  --  31.2* 30.1*   RDW 11.6  --  11.6 11.4*   METASPCT  --   --   --  0.9   LYMPHOPCT 30.0  --  30.0 36.5   MONOPCT 16.0*  --  12.0 12.2*   MYELOPCT  --    < > 1.0 0.9   BASOPCT 0.0  --  1.0 0.4   MONOSABS 0.32  --  0.30 0.26   LYMPHSABS 0.62*  --  0.83* 0.86*   EOSABS 0.04*  --  0.10 0.08   BASOSABS 0.00  --  0.03 0.00    < > = values in this interval not displayed.      Recent Labs     06/29/20  0657 06/29/20  1408 06/30/20  0552    140 140   K 4.4 4.6 4.1    103 103   CO2 28 27 29   BUN 8 11 10   CREATININE 1.1 1.2 1.1   GFRAA >60 >60 >60   LABGLOM >60 >60 >60   GLUCOSE 128* 108* 111*   PROT  --  6.4 6.2*   LABALBU  --  3.5 3.4*   CALCIUM 8.7 8.8 8.7   BILITOT  --  1.8* 1.7*   ALKPHOS  --  34* 33*   AST  --  234* 231*   ALT  --  139* 168*     U/A:    Lab Results   Component Value Date    COLORU DAVID 06/26/2020    PROTEINU >=300 06/26/2020    PHUR 7.0 06/26/2020    WBCUA 2-5 06/26/2020    RBCUA NONE 06/26/2020    BACTERIA FEW 06/26/2020    CLARITYU SLCLOUDY 06/26/2020    SPECGRAV 1.015 06/26/2020    LEUKOCYTESUR TRACE 06/26/2020    UROBILINOGEN 4.0 06/26/2020    BILIRUBINUR SMALL 06/26/2020    BLOODU LARGE 06/26/2020    GLUCOSEU Negative 06/26/2020        MICRO  Blood cultures No results found for: [facility]    ASSESSMENT:    Active Hospital Problems    Diagnosis Date Noted    COVID-19 [U07.1] 06/28/2020    COVID-19 virus infection [U07.1] 06/26/2020     LEUKOPENIA  FEVERS RESOLVED  SARS2-COVID-19 PNEUMONIA ON RA  HYPERBILIRUBINEMIA    · Actemra IL-6 6/26  · US RUQ NEG  Not a candidate for remesivir    Hiv/hep screen NEG   D/c planning  TODAY   F/U PRN        Follow COVID-19/BALA-COV2 LABS       PLAN: CONTINUE CURRENT MEDICATIONS AND SUPPORTIVE CARE.       Electronically signed by Jessa Vasquez MD on 6/27/20 at 3:24 PM EDT

## 2020-07-01 LAB
ABSOLUTE CD 3: 538 CELLS/UL (ref 570–2400)
ABSOLUTE CD 4 HELPER: 364 CELLS/UL (ref 430–1800)
ABSOLUTE CD 8 (SUPP): 172 CELLS/UL (ref 210–1200)
CD4/CD8 RATIO: 2.12 RATIO (ref 0.8–3.9)
LYMPH SUBSET INFORMATION: ABNORMAL

## 2024-04-23 NOTE — PROGRESS NOTES
Progress Note  6/28/2020 10:19 AM  Subjective:   Admit Date: 6/26/2020  PCP: Kady Moise, DO  Interval History: Patient examined doing ok feels well     Diet: DIET GENERAL;  Dietary Nutrition Supplements: Standard High Calorie Oral Supplement    Data:   Scheduled Meds:   vitamin C  500 mg Oral Daily    vitamin B-6  50 mg Oral Daily    zinc sulfate  50 mg Oral Daily    docusate sodium  100 mg Oral Daily     Continuous Infusions:   sodium chloride 100 mL/hr at 06/28/20 0400     PRN Meds:acetaminophen  I/O last 3 completed shifts: In: 2227 [P.O.:480; I.V.:1747]  Out: 1600 [Urine:1600]  No intake/output data recorded. Intake/Output Summary (Last 24 hours) at 6/28/2020 1019  Last data filed at 6/28/2020 0514  Gross per 24 hour   Intake 2227 ml   Output 1600 ml   Net 627 ml     CBC:   Recent Labs     06/26/20  1045 06/27/20  0715 06/28/20  0800   WBC 3.4* 2.2* 2.0*   HGB 12.0* 11.4* 10.9*   * 128* 150     BMP:    Recent Labs     06/26/20  1045 06/27/20  0715 06/28/20  0800    141 141   K 3.8 4.7 4.6   CL 97* 104 104   CO2 26 26 27   BUN 15 12 10   CREATININE 1.6* 1.4* 1.1   GLUCOSE 101* 109* 119*     Hepatic:   Recent Labs     06/26/20  1045 06/27/20  0715   AST 94* 89*   ALT 26 29   BILITOT 1.6* 1.8*   ALKPHOS 34* 34*     Troponin:   Recent Labs     06/26/20  1045   TROPONINI <0.01     BNP: No results for input(s): BNP in the last 72 hours. Lipids:   Recent Labs     06/27/20  0715   CHOL 161   HDL 28     ABGs: No results found for: PHART, PO2ART, BCV9KMH  INR:   Recent Labs     06/26/20  1045 06/28/20  0800   INR 1.3 1.2       -----------------------------------------------------------------  RAD: Xr Chest Portable    Result Date: 6/26/2020  EXAMINATION: ONE XRAY VIEW OF THE CHEST 6/26/2020 11:04 am COMPARISON: None.  HISTORY: ORDERING SYSTEM PROVIDED HISTORY: covid, sob TECHNOLOGIST PROVIDED HISTORY: Reason for exam:->covid, sob FINDINGS: Patchy areas of hazy consolidation in medial right lung base and peripheral left lung base. No pneumothorax or pleural effusion. Heart size is normal.     Patchy areas of basilar consolidation compatible with COVID-19 pneumonia. Us Abdomen Limited Specify Organ? Liver, Gallbladder    Result Date: 6/27/2020  EXAMINATION: RIGHT UPPER QUADRANT ULTRASOUND 6/27/2020 6:24 pm COMPARISON: None. HISTORY: ORDERING SYSTEM PROVIDED HISTORY: INC LFT TECHNOLOGIST PROVIDED HISTORY: Reason for exam:->INC LFT Specify organ?->LIVER Specify organ?->GALLBLADDER FINDINGS: LIVER:  The liver demonstrates normal echogenicity without evidence of intrahepatic biliary ductal dilatation. BILIARY SYSTEM:  Gallbladder is unremarkable without evidence of pericholecystic fluid, wall thickening or stones. Negative sonographic Garcia's sign. Common bile duct is within normal limits measuring 3.4 mm. RIGHT KIDNEY: The right kidney is grossly unremarkable without evidence of hydronephrosis. PANCREAS:  Visualized portions of the pancreas are unremarkable. OTHER: No evidence of right upper quadrant ascites. Unremarkable right upper quadrant ultrasound. Objective:   Vitals: /77   Pulse 83   Temp 97.5 °F (36.4 °C) (Oral)   Resp 18   Ht 5' 9\" (1.753 m)   Wt 206 lb (93.4 kg)   SpO2 96%   BMI 30.42 kg/m²   General appearance: appears stated age   Skin:  No rashes or lesions  HEENT: Head: Normocephalic, no lesions, without obvious abnormality.   Neck: no adenopathy, no carotid bruit, no JVD, supple, symmetrical, trachea midline and thyroid not enlarged, symmetric, no tenderness/mass/nodules  Lungs: clear to auscultation bilaterally  Heart: regular rate and rhythm, S1, S2 normal, no murmur, click, rub or gallop  Abdomen: soft, non-tender; bowel sounds normal; no masses,  no organomegaly  Extremities: extremities normal, atraumatic, no cyanosis or edema  Neurologic: Mental status: Alert, oriented, thought content appropriate    Assessment:   Patient Active Problem List:     COVID-19 virus infection    Plan:     IMPRESSION:  1.  Rhabdomyolysis.  Rhabdomyolysis of undetermined etiology.  There  have been reported cases of rhabdomyolysis associated with COVID-19  infection.     2.  Renal insufficiency.  We do not have any baseline serum creatinine  available for comparison.  We will follow serial electrolytes.  The  patient may have some renal insufficiency and associated use of Bactrim  in a volume depleted state with fever. Creatinine better at 1.1     3. Duval Bibber may be again a reflection of fever.  We  will need to reestablish the magnitude of proteinuria once the acute  illness is over.     PLAN: Rica Conner is to continue hydration.       Thank you for allowing me to participate in the care of this patient.      We will follow the patient with you.     [address] Patient/Caregiver provided printed discharge information.